# Patient Record
Sex: FEMALE | Race: WHITE | NOT HISPANIC OR LATINO | Employment: PART TIME | URBAN - METROPOLITAN AREA
[De-identification: names, ages, dates, MRNs, and addresses within clinical notes are randomized per-mention and may not be internally consistent; named-entity substitution may affect disease eponyms.]

---

## 2017-02-16 ENCOUNTER — HOSPITAL ENCOUNTER (OUTPATIENT)
Dept: RADIOLOGY | Facility: MEDICAL CENTER | Age: 37
End: 2017-02-16
Attending: PHYSICIAN ASSISTANT
Payer: MEDICAID

## 2017-02-16 DIAGNOSIS — R13.19 CONSTANT LOW-GRADE DYSPHAGIA: ICD-10-CM

## 2017-02-16 DIAGNOSIS — K59.00 UNSPECIFIED CONSTIPATION: ICD-10-CM

## 2017-02-16 DIAGNOSIS — R14.0 ABDOMINAL DISTENTION: ICD-10-CM

## 2017-02-16 PROCEDURE — 700101 HCHG RX REV CODE 250: Performed by: PHYSICIAN ASSISTANT

## 2017-02-16 PROCEDURE — 74220 X-RAY XM ESOPHAGUS 1CNTRST: CPT

## 2017-02-16 RX ADMIN — BARIUM SULFATE 700 MG: 700 TABLET ORAL at 10:20

## 2017-02-24 ENCOUNTER — HOSPITAL ENCOUNTER (OUTPATIENT)
Dept: RADIOLOGY | Facility: MEDICAL CENTER | Age: 37
End: 2017-02-24
Attending: PHYSICIAN ASSISTANT
Payer: MEDICAID

## 2017-02-24 DIAGNOSIS — K59.00 UNSPECIFIED CONSTIPATION: ICD-10-CM

## 2017-02-24 DIAGNOSIS — R14.0 ABDOMINAL DISTENTION: ICD-10-CM

## 2017-02-24 DIAGNOSIS — R13.19 CONSTANT LOW-GRADE DYSPHAGIA: ICD-10-CM

## 2017-02-24 PROCEDURE — 74250 X-RAY XM SM INT 1CNTRST STD: CPT

## 2017-06-29 ENCOUNTER — HOSPITAL ENCOUNTER (EMERGENCY)
Dept: HOSPITAL 8 - ED | Age: 37
Discharge: HOME | End: 2017-06-29
Payer: MEDICAID

## 2017-06-29 VITALS — WEIGHT: 182.76 LBS | HEIGHT: 69 IN | BODY MASS INDEX: 27.07 KG/M2

## 2017-06-29 VITALS — DIASTOLIC BLOOD PRESSURE: 80 MMHG | SYSTOLIC BLOOD PRESSURE: 110 MMHG

## 2017-06-29 DIAGNOSIS — R10.32: ICD-10-CM

## 2017-06-29 DIAGNOSIS — R10.31: Primary | ICD-10-CM

## 2017-06-29 LAB
AST SERPL-CCNC: 11 U/L (ref 15–37)
BUN SERPL-MCNC: 8 MG/DL (ref 7–18)

## 2017-06-29 PROCEDURE — 74177 CT ABD & PELVIS W/CONTRAST: CPT

## 2017-06-29 PROCEDURE — 96375 TX/PRO/DX INJ NEW DRUG ADDON: CPT

## 2017-06-29 PROCEDURE — 99285 EMERGENCY DEPT VISIT HI MDM: CPT

## 2017-06-29 PROCEDURE — 80053 COMPREHEN METABOLIC PANEL: CPT

## 2017-06-29 PROCEDURE — 81003 URINALYSIS AUTO W/O SCOPE: CPT

## 2017-06-29 PROCEDURE — 96361 HYDRATE IV INFUSION ADD-ON: CPT

## 2017-06-29 PROCEDURE — 83690 ASSAY OF LIPASE: CPT

## 2017-06-29 PROCEDURE — 96372 THER/PROPH/DIAG INJ SC/IM: CPT

## 2017-06-29 PROCEDURE — 96374 THER/PROPH/DIAG INJ IV PUSH: CPT

## 2017-06-29 PROCEDURE — 36415 COLL VENOUS BLD VENIPUNCTURE: CPT

## 2017-06-29 PROCEDURE — 96376 TX/PRO/DX INJ SAME DRUG ADON: CPT

## 2017-06-29 PROCEDURE — 85025 COMPLETE CBC W/AUTO DIFF WBC: CPT

## 2017-06-29 RX ADMIN — MORPHINE SULFATE PRN MG: 4 INJECTION INTRAVENOUS at 16:52

## 2017-06-29 RX ADMIN — MORPHINE SULFATE PRN MG: 4 INJECTION INTRAVENOUS at 17:33

## 2017-09-11 ENCOUNTER — HOSPITAL ENCOUNTER (EMERGENCY)
Dept: HOSPITAL 8 - ED | Age: 37
Discharge: HOME | End: 2017-09-11
Payer: MEDICAID

## 2017-09-11 VITALS — SYSTOLIC BLOOD PRESSURE: 122 MMHG | DIASTOLIC BLOOD PRESSURE: 68 MMHG

## 2017-09-11 VITALS — BODY MASS INDEX: 26.78 KG/M2 | WEIGHT: 180.78 LBS | HEIGHT: 69 IN

## 2017-09-11 DIAGNOSIS — R07.2: Primary | ICD-10-CM

## 2017-09-11 DIAGNOSIS — F41.1: ICD-10-CM

## 2017-09-11 DIAGNOSIS — F17.200: ICD-10-CM

## 2017-09-11 LAB
AST SERPL-CCNC: 10 U/L (ref 15–37)
BUN SERPL-MCNC: 7 MG/DL (ref 7–18)
HCT VFR BLD CALC: 44.1 % (ref 34.6–47.8)
HGB BLD-MCNC: 14.9 G/DL (ref 11.7–16.4)
IS PT STATUS REG ER OR PRE ER?: YES
WBC # BLD AUTO: 10.8 X10^3/UL (ref 3.4–10)

## 2017-09-11 PROCEDURE — 36415 COLL VENOUS BLD VENIPUNCTURE: CPT

## 2017-09-11 PROCEDURE — 99285 EMERGENCY DEPT VISIT HI MDM: CPT

## 2017-09-11 PROCEDURE — 71010: CPT

## 2017-09-11 PROCEDURE — 93005 ELECTROCARDIOGRAM TRACING: CPT

## 2017-09-11 PROCEDURE — 84484 ASSAY OF TROPONIN QUANT: CPT

## 2017-09-11 PROCEDURE — 80053 COMPREHEN METABOLIC PANEL: CPT

## 2017-09-11 PROCEDURE — 85025 COMPLETE CBC W/AUTO DIFF WBC: CPT

## 2017-10-25 ENCOUNTER — HOSPITAL ENCOUNTER (EMERGENCY)
Dept: HOSPITAL 8 - ED | Age: 37
Discharge: HOME | End: 2017-10-25
Payer: MEDICAID

## 2017-10-25 VITALS — DIASTOLIC BLOOD PRESSURE: 67 MMHG | SYSTOLIC BLOOD PRESSURE: 100 MMHG

## 2017-10-25 VITALS — WEIGHT: 179.9 LBS | BODY MASS INDEX: 26.64 KG/M2 | HEIGHT: 69 IN

## 2017-10-25 DIAGNOSIS — R19.7: ICD-10-CM

## 2017-10-25 DIAGNOSIS — R10.84: Primary | ICD-10-CM

## 2017-10-25 DIAGNOSIS — Z90.49: ICD-10-CM

## 2017-10-25 LAB
AST SERPL-CCNC: 20 U/L (ref 15–37)
BUN SERPL-MCNC: 8 MG/DL (ref 7–18)
HCT VFR BLD CALC: 50.7 % (ref 34.6–47.8)
HGB BLD-MCNC: 17 G/DL (ref 11.7–16.4)
WBC # BLD AUTO: 12.6 X10^3/UL (ref 3.4–10)

## 2017-10-25 PROCEDURE — 96361 HYDRATE IV INFUSION ADD-ON: CPT

## 2017-10-25 PROCEDURE — 99285 EMERGENCY DEPT VISIT HI MDM: CPT

## 2017-10-25 PROCEDURE — 84703 CHORIONIC GONADOTROPIN ASSAY: CPT

## 2017-10-25 PROCEDURE — 74022 RADEX COMPL AQT ABD SERIES: CPT

## 2017-10-25 PROCEDURE — 96375 TX/PRO/DX INJ NEW DRUG ADDON: CPT

## 2017-10-25 PROCEDURE — 96374 THER/PROPH/DIAG INJ IV PUSH: CPT

## 2017-10-25 PROCEDURE — 81001 URINALYSIS AUTO W/SCOPE: CPT

## 2017-10-25 PROCEDURE — 36415 COLL VENOUS BLD VENIPUNCTURE: CPT

## 2017-10-25 PROCEDURE — 80053 COMPREHEN METABOLIC PANEL: CPT

## 2017-10-25 PROCEDURE — 83690 ASSAY OF LIPASE: CPT

## 2017-10-25 PROCEDURE — 85025 COMPLETE CBC W/AUTO DIFF WBC: CPT

## 2018-10-12 ENCOUNTER — HOSPITAL ENCOUNTER (EMERGENCY)
Dept: HOSPITAL 8 - ED | Age: 38
Discharge: HOME | End: 2018-10-12
Payer: MEDICAID

## 2018-10-12 VITALS — DIASTOLIC BLOOD PRESSURE: 63 MMHG | SYSTOLIC BLOOD PRESSURE: 116 MMHG

## 2018-10-12 VITALS — BODY MASS INDEX: 28.54 KG/M2 | WEIGHT: 192.68 LBS | HEIGHT: 69 IN

## 2018-10-12 DIAGNOSIS — R10.13: ICD-10-CM

## 2018-10-12 DIAGNOSIS — R07.2: Primary | ICD-10-CM

## 2018-10-12 LAB
ALBUMIN SERPL-MCNC: 3.8 G/DL (ref 3.4–5)
ALP SERPL-CCNC: 63 U/L (ref 45–117)
ALT SERPL-CCNC: 19 U/L (ref 12–78)
ANION GAP SERPL CALC-SCNC: 7 MMOL/L (ref 5–15)
BASOPHILS # BLD AUTO: 0.03 X10^3/UL (ref 0–0.1)
BASOPHILS NFR BLD AUTO: 0 % (ref 0–1)
BILIRUB SERPL-MCNC: 0.6 MG/DL (ref 0.2–1)
CALCIUM SERPL-MCNC: 8.8 MG/DL (ref 8.5–10.1)
CHLORIDE SERPL-SCNC: 110 MMOL/L (ref 98–107)
CREAT SERPL-MCNC: 0.52 MG/DL (ref 0.55–1.02)
EOSINOPHIL # BLD AUTO: 0.24 X10^3/UL (ref 0–0.4)
EOSINOPHIL NFR BLD AUTO: 3 % (ref 1–7)
ERYTHROCYTE [DISTWIDTH] IN BLOOD BY AUTOMATED COUNT: 13 % (ref 9.6–15.2)
LYMPHOCYTES # BLD AUTO: 2.71 X10^3/UL (ref 1–3.4)
LYMPHOCYTES NFR BLD AUTO: 31 % (ref 22–44)
MCH RBC QN AUTO: 28.5 PG (ref 27–34.8)
MCHC RBC AUTO-ENTMCNC: 33.6 G/DL (ref 32.4–35.8)
MCV RBC AUTO: 85 FL (ref 80–100)
MD: NO
MONOCYTES # BLD AUTO: 0.68 X10^3/UL (ref 0.2–0.8)
MONOCYTES NFR BLD AUTO: 8 % (ref 2–9)
NEUTROPHILS # BLD AUTO: 5.02 X10^3/UL (ref 1.8–6.8)
NEUTROPHILS NFR BLD AUTO: 58 % (ref 42–75)
PLATELET # BLD AUTO: 335 X10^3/UL (ref 130–400)
PMV BLD AUTO: 7.6 FL (ref 7.4–10.4)
PROT SERPL-MCNC: 7.8 G/DL (ref 6.4–8.2)
RBC # BLD AUTO: 4.87 X10^6/UL (ref 3.82–5.3)
TROPONIN I SERPL-MCNC: < 0.015 NG/ML (ref 0–0.04)

## 2018-10-12 PROCEDURE — 83690 ASSAY OF LIPASE: CPT

## 2018-10-12 PROCEDURE — 99285 EMERGENCY DEPT VISIT HI MDM: CPT

## 2018-10-12 PROCEDURE — 71046 X-RAY EXAM CHEST 2 VIEWS: CPT

## 2018-10-12 PROCEDURE — 80053 COMPREHEN METABOLIC PANEL: CPT

## 2018-10-12 PROCEDURE — 96375 TX/PRO/DX INJ NEW DRUG ADDON: CPT

## 2018-10-12 PROCEDURE — 74177 CT ABD & PELVIS W/CONTRAST: CPT

## 2018-10-12 PROCEDURE — 85025 COMPLETE CBC W/AUTO DIFF WBC: CPT

## 2018-10-12 PROCEDURE — S0028 INJECTION, FAMOTIDINE, 20 MG: HCPCS

## 2018-10-12 PROCEDURE — 36415 COLL VENOUS BLD VENIPUNCTURE: CPT

## 2018-10-12 PROCEDURE — 84484 ASSAY OF TROPONIN QUANT: CPT

## 2018-10-12 PROCEDURE — 85379 FIBRIN DEGRADATION QUANT: CPT

## 2018-10-12 PROCEDURE — 93005 ELECTROCARDIOGRAM TRACING: CPT

## 2018-10-12 PROCEDURE — 96374 THER/PROPH/DIAG INJ IV PUSH: CPT

## 2018-10-12 RX ADMIN — HYDROMORPHONE HYDROCHLORIDE PRN MG: 2 INJECTION INTRAMUSCULAR; INTRAVENOUS; SUBCUTANEOUS at 16:11

## 2018-10-12 RX ADMIN — HYDROMORPHONE HYDROCHLORIDE PRN MG: 2 INJECTION INTRAMUSCULAR; INTRAVENOUS; SUBCUTANEOUS at 16:30

## 2019-03-10 ENCOUNTER — HOSPITAL ENCOUNTER (EMERGENCY)
Dept: HOSPITAL 8 - ED | Age: 39
Discharge: HOME | End: 2019-03-10
Payer: MEDICAID

## 2019-03-10 VITALS — DIASTOLIC BLOOD PRESSURE: 80 MMHG | SYSTOLIC BLOOD PRESSURE: 115 MMHG

## 2019-03-10 VITALS — HEIGHT: 69 IN | BODY MASS INDEX: 29.39 KG/M2 | WEIGHT: 198.42 LBS
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
## 2019-03-10 DIAGNOSIS — S40.021A: Primary | ICD-10-CM
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
## 2019-03-10 DIAGNOSIS — S50.01XA: ICD-10-CM

## 2019-03-10 DIAGNOSIS — S60.211A: ICD-10-CM
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
## 2019-03-10 DIAGNOSIS — F17.200: ICD-10-CM

## 2019-03-10 PROCEDURE — 99283 EMERGENCY DEPT VISIT LOW MDM: CPT

## 2019-03-10 PROCEDURE — 29105 APPLICATION LONG ARM SPLINT: CPT

## 2019-06-20 ENCOUNTER — HOSPITAL ENCOUNTER (EMERGENCY)
Dept: HOSPITAL 8 - ED | Age: 39
Discharge: HOME | End: 2019-06-20
Payer: MEDICAID

## 2019-06-20 VITALS — BODY MASS INDEX: 28.96 KG/M2 | WEIGHT: 195.55 LBS | HEIGHT: 69 IN

## 2019-06-20 VITALS — DIASTOLIC BLOOD PRESSURE: 53 MMHG | SYSTOLIC BLOOD PRESSURE: 107 MMHG

## 2019-06-20 DIAGNOSIS — A09: Primary | ICD-10-CM

## 2019-06-20 DIAGNOSIS — K21.9: ICD-10-CM

## 2019-06-20 DIAGNOSIS — R11.0: ICD-10-CM

## 2019-06-20 DIAGNOSIS — Z90.710: ICD-10-CM

## 2019-06-20 DIAGNOSIS — F41.1: ICD-10-CM

## 2019-06-20 DIAGNOSIS — Z90.49: ICD-10-CM

## 2019-06-20 LAB
ALBUMIN SERPL-MCNC: 3.8 G/DL (ref 3.4–5)
ALP SERPL-CCNC: 59 U/L (ref 45–117)
ALT SERPL-CCNC: 20 U/L (ref 12–78)
ANION GAP SERPL CALC-SCNC: 7 MMOL/L (ref 5–15)
BASOPHILS # BLD AUTO: 0.05 X10^3/UL (ref 0–0.1)
BASOPHILS NFR BLD AUTO: 1 % (ref 0–1)
BILIRUB SERPL-MCNC: 0.4 MG/DL (ref 0.2–1)
CALCIUM SERPL-MCNC: 9.1 MG/DL (ref 8.5–10.1)
CHLORIDE SERPL-SCNC: 108 MMOL/L (ref 98–107)
CREAT SERPL-MCNC: 0.77 MG/DL (ref 0.55–1.02)
EOSINOPHIL # BLD AUTO: 0.22 X10^3/UL (ref 0–0.4)
EOSINOPHIL NFR BLD AUTO: 2 % (ref 1–7)
ERYTHROCYTE [DISTWIDTH] IN BLOOD BY AUTOMATED COUNT: 14.4 % (ref 9.6–15.2)
LYMPHOCYTES # BLD AUTO: 3.14 X10^3/UL (ref 1–3.4)
LYMPHOCYTES NFR BLD AUTO: 30 % (ref 22–44)
MCH RBC QN AUTO: 27.9 PG (ref 27–34.8)
MCHC RBC AUTO-ENTMCNC: 33 G/DL (ref 32.4–35.8)
MCV RBC AUTO: 84.4 FL (ref 80–100)
MD: NO
MONOCYTES # BLD AUTO: 0.43 X10^3/UL (ref 0.2–0.8)
MONOCYTES NFR BLD AUTO: 4 % (ref 2–9)
NEUTROPHILS # BLD AUTO: 6.72 X10^3/UL (ref 1.8–6.8)
NEUTROPHILS NFR BLD AUTO: 64 % (ref 42–75)
PLATELET # BLD AUTO: 330 X10^3/UL (ref 130–400)
PMV BLD AUTO: 7.7 FL (ref 7.4–10.4)
PROT SERPL-MCNC: 7.7 G/DL (ref 6.4–8.2)
RBC # BLD AUTO: 5.09 X10^6/UL (ref 3.82–5.3)

## 2019-06-20 PROCEDURE — 85025 COMPLETE CBC W/AUTO DIFF WBC: CPT

## 2019-06-20 PROCEDURE — 80053 COMPREHEN METABOLIC PANEL: CPT

## 2019-06-20 PROCEDURE — 99283 EMERGENCY DEPT VISIT LOW MDM: CPT

## 2019-06-20 PROCEDURE — 83690 ASSAY OF LIPASE: CPT

## 2019-06-20 PROCEDURE — 84703 CHORIONIC GONADOTROPIN ASSAY: CPT

## 2019-06-20 PROCEDURE — 36415 COLL VENOUS BLD VENIPUNCTURE: CPT

## 2019-06-20 NOTE — NUR
Labs drawn, pt medicated per MAR. Pt reports abdominal pain, bloating, nausea, 
and diarrhea. Pt states diarrhea has been getting more formed over the past 
couple of days and the bloating is new today.

## 2019-10-16 ENCOUNTER — TELEPHONE (OUTPATIENT)
Dept: SCHEDULING | Facility: IMAGING CENTER | Age: 39
End: 2019-10-16

## 2021-02-04 ENCOUNTER — HOSPITAL ENCOUNTER (EMERGENCY)
Dept: HOSPITAL 8 - ED | Age: 41
Discharge: HOME | End: 2021-02-04
Payer: MEDICAID

## 2021-02-04 VITALS — SYSTOLIC BLOOD PRESSURE: 110 MMHG | DIASTOLIC BLOOD PRESSURE: 60 MMHG

## 2021-02-04 VITALS — WEIGHT: 195.77 LBS | BODY MASS INDEX: 29 KG/M2 | HEIGHT: 69 IN

## 2021-02-04 DIAGNOSIS — J02.9: ICD-10-CM

## 2021-02-04 DIAGNOSIS — H92.02: Primary | ICD-10-CM

## 2021-02-04 DIAGNOSIS — B34.9: ICD-10-CM

## 2021-02-04 DIAGNOSIS — K21.9: ICD-10-CM

## 2021-02-04 PROCEDURE — 99283 EMERGENCY DEPT VISIT LOW MDM: CPT

## 2021-02-04 NOTE — NUR
SEEN AT  ON MONDAY FOR LT EAR PAIN/BODYACHES/HA, NO RELIEF W/ ABX, STILL HAS 
2 DAYS LEFT. 



NEGATIVE COVID TEST MONDAY.

 

IS ON AZRITHOMYCIN LAST 2 DAYS

## 2022-04-13 ENCOUNTER — APPOINTMENT (OUTPATIENT)
Dept: RADIOLOGY | Facility: MEDICAL CENTER | Age: 42
End: 2022-04-13
Attending: EMERGENCY MEDICINE
Payer: MEDICAID

## 2022-04-13 ENCOUNTER — HOSPITAL ENCOUNTER (EMERGENCY)
Facility: MEDICAL CENTER | Age: 42
End: 2022-04-14
Attending: EMERGENCY MEDICINE
Payer: MEDICAID

## 2022-04-13 DIAGNOSIS — M54.10 RADICULOPATHY, UNSPECIFIED SPINAL REGION: ICD-10-CM

## 2022-04-13 DIAGNOSIS — M51.34 BULGING OF THORACIC INTERVERTEBRAL DISC: ICD-10-CM

## 2022-04-13 LAB
ALBUMIN SERPL BCP-MCNC: 4.7 G/DL (ref 3.2–4.9)
ALBUMIN/GLOB SERPL: 1.4 G/DL
ALP SERPL-CCNC: 64 U/L (ref 30–99)
ALT SERPL-CCNC: 17 U/L (ref 2–50)
ANION GAP SERPL CALC-SCNC: 11 MMOL/L (ref 7–16)
APPEARANCE UR: CLEAR
AST SERPL-CCNC: 17 U/L (ref 12–45)
BASOPHILS # BLD AUTO: 0.5 % (ref 0–1.8)
BASOPHILS # BLD: 0.04 K/UL (ref 0–0.12)
BILIRUB SERPL-MCNC: 0.3 MG/DL (ref 0.1–1.5)
BILIRUB UR QL STRIP.AUTO: ABNORMAL
BUN SERPL-MCNC: 7 MG/DL (ref 8–22)
CALCIUM SERPL-MCNC: 9.5 MG/DL (ref 8.5–10.5)
CHLORIDE SERPL-SCNC: 103 MMOL/L (ref 96–112)
CO2 SERPL-SCNC: 24 MMOL/L (ref 20–33)
COLOR UR: ABNORMAL
CREAT SERPL-MCNC: 0.58 MG/DL (ref 0.5–1.4)
EOSINOPHIL # BLD AUTO: 0.24 K/UL (ref 0–0.51)
EOSINOPHIL NFR BLD: 3.2 % (ref 0–6.9)
ERYTHROCYTE [DISTWIDTH] IN BLOOD BY AUTOMATED COUNT: 46.2 FL (ref 35.9–50)
GFR SERPLBLD CREATININE-BSD FMLA CKD-EPI: 116 ML/MIN/1.73 M 2
GLOBULIN SER CALC-MCNC: 3.3 G/DL (ref 1.9–3.5)
GLUCOSE SERPL-MCNC: 87 MG/DL (ref 65–99)
GLUCOSE UR STRIP.AUTO-MCNC: NEGATIVE MG/DL
HCG SERPL QL: NEGATIVE
HCT VFR BLD AUTO: 46.4 % (ref 37–47)
HGB BLD-MCNC: 14.6 G/DL (ref 12–16)
IMM GRANULOCYTES # BLD AUTO: 0.03 K/UL (ref 0–0.11)
IMM GRANULOCYTES NFR BLD AUTO: 0.4 % (ref 0–0.9)
KETONES UR STRIP.AUTO-MCNC: ABNORMAL MG/DL
LEUKOCYTE ESTERASE UR QL STRIP.AUTO: NEGATIVE
LYMPHOCYTES # BLD AUTO: 3.31 K/UL (ref 1–4.8)
LYMPHOCYTES NFR BLD: 43.7 % (ref 22–41)
MCH RBC QN AUTO: 27.8 PG (ref 27–33)
MCHC RBC AUTO-ENTMCNC: 31.5 G/DL (ref 33.6–35)
MCV RBC AUTO: 88.4 FL (ref 81.4–97.8)
MICRO URNS: ABNORMAL
MONOCYTES # BLD AUTO: 0.73 K/UL (ref 0–0.85)
MONOCYTES NFR BLD AUTO: 9.6 % (ref 0–13.4)
NEUTROPHILS # BLD AUTO: 3.23 K/UL (ref 2–7.15)
NEUTROPHILS NFR BLD: 42.6 % (ref 44–72)
NITRITE UR QL STRIP.AUTO: NEGATIVE
NRBC # BLD AUTO: 0 K/UL
NRBC BLD-RTO: 0 /100 WBC
PH UR STRIP.AUTO: 5.5 [PH] (ref 5–8)
PLATELET # BLD AUTO: 295 K/UL (ref 164–446)
PMV BLD AUTO: 9.3 FL (ref 9–12.9)
POTASSIUM SERPL-SCNC: 3.7 MMOL/L (ref 3.6–5.5)
PROT SERPL-MCNC: 8 G/DL (ref 6–8.2)
PROT UR QL STRIP: NEGATIVE MG/DL
RBC # BLD AUTO: 5.25 M/UL (ref 4.2–5.4)
RBC UR QL AUTO: NEGATIVE
SODIUM SERPL-SCNC: 138 MMOL/L (ref 135–145)
SP GR UR STRIP.AUTO: 1.03
UROBILINOGEN UR STRIP.AUTO-MCNC: 0.2 MG/DL
WBC # BLD AUTO: 7.6 K/UL (ref 4.8–10.8)

## 2022-04-13 PROCEDURE — A9270 NON-COVERED ITEM OR SERVICE: HCPCS | Performed by: EMERGENCY MEDICINE

## 2022-04-13 PROCEDURE — 85025 COMPLETE CBC W/AUTO DIFF WBC: CPT

## 2022-04-13 PROCEDURE — 96375 TX/PRO/DX INJ NEW DRUG ADDON: CPT

## 2022-04-13 PROCEDURE — 72146 MRI CHEST SPINE W/O DYE: CPT

## 2022-04-13 PROCEDURE — 99285 EMERGENCY DEPT VISIT HI MDM: CPT

## 2022-04-13 PROCEDURE — 700102 HCHG RX REV CODE 250 W/ 637 OVERRIDE(OP): Performed by: EMERGENCY MEDICINE

## 2022-04-13 PROCEDURE — 36415 COLL VENOUS BLD VENIPUNCTURE: CPT

## 2022-04-13 PROCEDURE — 84703 CHORIONIC GONADOTROPIN ASSAY: CPT

## 2022-04-13 PROCEDURE — 700111 HCHG RX REV CODE 636 W/ 250 OVERRIDE (IP): Performed by: EMERGENCY MEDICINE

## 2022-04-13 PROCEDURE — 96374 THER/PROPH/DIAG INJ IV PUSH: CPT

## 2022-04-13 PROCEDURE — 72148 MRI LUMBAR SPINE W/O DYE: CPT

## 2022-04-13 PROCEDURE — 80053 COMPREHEN METABOLIC PANEL: CPT

## 2022-04-13 PROCEDURE — 81003 URINALYSIS AUTO W/O SCOPE: CPT

## 2022-04-13 RX ORDER — CYCLOBENZAPRINE HCL 10 MG
10 TABLET ORAL ONCE
Status: COMPLETED | OUTPATIENT
Start: 2022-04-13 | End: 2022-04-13

## 2022-04-13 RX ORDER — PREDNISONE 20 MG/1
40 TABLET ORAL ONCE
Status: COMPLETED | OUTPATIENT
Start: 2022-04-13 | End: 2022-04-13

## 2022-04-13 RX ORDER — KETOROLAC TROMETHAMINE 30 MG/ML
30 INJECTION, SOLUTION INTRAMUSCULAR; INTRAVENOUS ONCE
Status: COMPLETED | OUTPATIENT
Start: 2022-04-13 | End: 2022-04-13

## 2022-04-13 RX ORDER — LORAZEPAM 2 MG/ML
1 INJECTION INTRAMUSCULAR ONCE
Status: COMPLETED | OUTPATIENT
Start: 2022-04-13 | End: 2022-04-13

## 2022-04-13 RX ORDER — PREDNISONE 20 MG/1
40 TABLET ORAL DAILY
Qty: 10 TABLET | Refills: 0 | Status: SHIPPED | OUTPATIENT
Start: 2022-04-13 | End: 2022-04-18

## 2022-04-13 RX ADMIN — LORAZEPAM 1 MG: 2 INJECTION INTRAMUSCULAR; INTRAVENOUS at 20:26

## 2022-04-13 RX ADMIN — PREDNISONE 40 MG: 20 TABLET ORAL at 19:40

## 2022-04-13 RX ADMIN — CYCLOBENZAPRINE 10 MG: 10 TABLET, FILM COATED ORAL at 19:40

## 2022-04-13 RX ADMIN — KETOROLAC TROMETHAMINE 30 MG: 30 INJECTION, SOLUTION INTRAMUSCULAR at 19:42

## 2022-04-13 ASSESSMENT — ENCOUNTER SYMPTOMS
NAUSEA: 0
FEVER: 0
BACK PAIN: 1
ABDOMINAL PAIN: 0
SHORTNESS OF BREATH: 0
CHILLS: 0
VOMITING: 0

## 2022-04-13 ASSESSMENT — PAIN DESCRIPTION - PAIN TYPE: TYPE: ACUTE PAIN

## 2022-04-14 VITALS
WEIGHT: 198.41 LBS | DIASTOLIC BLOOD PRESSURE: 58 MMHG | HEIGHT: 69 IN | HEART RATE: 63 BPM | BODY MASS INDEX: 29.39 KG/M2 | OXYGEN SATURATION: 93 % | SYSTOLIC BLOOD PRESSURE: 105 MMHG | TEMPERATURE: 97 F | RESPIRATION RATE: 18 BRPM

## 2022-04-14 NOTE — ED PROVIDER NOTES
ED Provider Note    Scribed for Yamilka Way M.D. by Sri Rubio. 4/13/2022, 6:41 PM.    Primary care provider: DEBO Higginbotham  Means of arrival: Walk-in  History obtained from: Patient  History limited by: None    CHIEF COMPLAINT  Chief Complaint   Patient presents with   • Leg Pain     R leg pain radiating down R leg. Patient reports pain/numbness and tingling.   • Back Pain     R lower back pain.        HPI  Edith Glass is a 41 y.o. female who presents to the Emergency Department for back pain onset 1-2 weeks ago. Patient describes that her injury occurred while lifting things at work and is located to the middle of her mid back. She has associated burning pain from her back down to her right lateral thigh and pelvis. Patient describes further that when she walks she has incontinence and this started with the back pain. Patient was seen by a chiropractor today who advised her to come to the ED. Denies dysuria.  She has still been able to ambulate and perform normal daily activities.  Denies any fevers or chills.    REVIEW OF SYSTEMS  Review of Systems   Constitutional: Negative for chills and fever.   Respiratory: Negative for shortness of breath.    Cardiovascular: Negative for chest pain.   Gastrointestinal: Negative for abdominal pain, nausea and vomiting.   Genitourinary: Negative for dysuria.        Positive for urinary incontinence and pelvic pain   Musculoskeletal: Positive for back pain.        Positive her right leg pain     All other systems reviewed and are negative.       PAST MEDICAL HISTORY   has a past medical history of Psychiatric disorder (depression).    SURGICAL HISTORY   has a past surgical history that includes other orthopedic surgery and hysterectomy laparoscopy.    SOCIAL HISTORY  Social History     Tobacco Use   • Smoking status: Current Every Day Smoker     Packs/day: 0.50     Years: 24.00     Pack years: 12.00     Types: Cigarettes   • Smokeless tobacco: Never  "Used   Substance Use Topics   • Alcohol use: No   • Drug use: No      Social History     Substance and Sexual Activity   Drug Use No     CURRENT MEDICATIONS  Home Medications     Reviewed by Mely Katz R.N. (Registered Nurse) on 04/13/22 at 1744  Med List Status: <None>   Medication Last Dose Status   albuterol 108 (90 BASE) MCG/ACT Aero Soln inhalation aerosol  Active   azithromycin (ZITHROMAX) 250 MG Tab  Active                 ALLERGIES  Allergies   Allergen Reactions   • Nkda [No Known Drug Allergy]        PHYSICAL EXAM  VITAL SIGNS: /67   Pulse 73   Temp 36.2 °C (97.1 °F) (Temporal)   Resp 16   Ht 1.753 m (5' 9\")   Wt 90 kg (198 lb 6.6 oz)   LMP 11/05/2009   SpO2 99%   BMI 29.30 kg/m²   Vitals reviewed by myself.  Physical Exam  Nursing note and vitals reviewed.  Constitutional: Well-developed and well-nourished. No acute distress.   HENT: Head is normocephalic and atraumatic.  Eyes: extra-ocular movements intact  Cardiovascular: regular rate and regular rhythm. No murmur heard.  Pulmonary/Chest: Breath sounds normal. No wheezes or rales.   Musculoskeletal: Patient has 5 out of 5 strength in all extremities.  Midline lower thoracic and upper lumbarspinal tenderness is present.  Sensation intact to bilateral lower extremities.  Patient complains of paresthesias along the right lateral thigh.  Neurological: Awake and alert, sensation intact to bilateral lower extremities  Skin: Skin is warm and dry. No rash.       DIAGNOSTIC STUDIES  LABS  Labs Reviewed   CBC WITH DIFFERENTIAL - Abnormal; Notable for the following components:       Result Value    MCHC 31.5 (*)     Neutrophils-Polys 42.60 (*)     Lymphocytes 43.70 (*)     All other components within normal limits   COMP METABOLIC PANEL - Abnormal; Notable for the following components:    Bun 7 (*)     All other components within normal limits   URINALYSIS,CULTURE IF INDICATED - Abnormal; Notable for the following components:    Ketones " Trace (*)     Bilirubin Small (*)     All other components within normal limits    Narrative:     Indication for culture:->Patient WITHOUT an indwelling Caicedo  catheter in place with new onset of Dysuria, Frequency,  Urgency, and/or Suprapubic pain   HCG QUAL SERUM   ESTIMATED GFR     All labs reviewed by me.    RADIOLOGY  MR-THORACIC SPINE-W/O    (Results Pending)   MR-LUMBAR SPINE-W/O    (Results Pending)     Preliminary reading demonstrates disc bulge at T6-T7, this is touching the spinal cord per radiologist without spinal cord edema and no cord signal    The radiologist's interpretation of all radiological studies have been reviewed by me.    REASSESSMENT    6:41 PM - Patient seen and examined at bedside. Discussed plan of care, including labs and an MRI. I informed the patient that the MRI may take several hours to obtain from the ER. Patient agrees to the plan of care. She will be treated with Toradol, flexeril, and Deltasone.     8:00 PM - Patient will be treated with ativan for claustrophobia for MRI.     10:01 PM - Updated the patient on the MRI results. We are waiting for a consult form the spine specialist to determine the best plan of care.    10:21 PM I discussed the patient's case and the above findings with Dr. Copeland (spine) who reviewed images and recommended outpatient follow up.     10:44 PM - Informed the patient of the need for outpatient follow up. Prescribed Prednisone for radiculopathy. Discussed return precautions. Patient will be discharged at this time. She verbalizes agreement with discharge and plan of care. Patient does not have a ride home. She will be allowed to rest in the ED for a few hours until the ativan wears off and she is more awake to safely drive.    COURSE & MEDICAL DECISION MAKING  Nursing notes, VS, PMSFHx reviewed in chart.    Patient is a 41-year-old female who comes in for evaluation of back pain and urinary incontinence.  Differential diagnosis includes radiculopathy,  herniated disc, spinal cord impingement.  Patient symptoms of incontinence and paresthesias are concerning for spinal cord impingement, therefore MRI of the thoracic and lumbar spine will be ordered.  Further diagnostic work-up includes labs and urinalysis.    Patient's initial vitals are within normal limits.  She is treated with prednisone, Toradol, Flexeril for management of discomfort after which she feels improved.  She is also given Ativan to tolerate MRI.  Labs returned and are unremarkable.  Urinalysis demonstrates no signs of infection.  MRI consistent with disc bulge at T6-T7 with touching of the spinal cord, no spinal cord edema or enhanced cord signal.  Given her symptoms I did discuss the case with Dr. Copeland, spine surgeon, who reviewed films and advises no evidence for emergent management.  Patient can follow-up outpatient, likely will need conservative therapy.  This is discussed with patient and she is amenable to this plan.  I will prescribe her a course of prednisone for her radiculopathy.  She is then given strict return precautions and discharged in stable condition.     he patient will return for new or worsening symptoms and is stable at the time of discharge.    DISPOSITION:  Patient will be discharged home in stable condition.    FOLLOW UP:  DEBO Higginbotham  [address]  [address]  [facility] [address]  [phone]          Sanjay Copeland M.D.  [address] [facility] [id number] [phone]    Schedule an appointment as soon as possible for a visit         OUTPATIENT MEDICATIONS:  New Prescriptions    PREDNISONE (DELTASONE) 20 MG TAB    Take 2 Tablets by mouth every day for 5 days.       FINAL IMPRESSION  1. Radiculopathy, unspecified spinal region    2. Bulging of thoracic intervertebral disc          Sri ORTIZ (Lizzeth), am scribing for, and in the presence of, Yamilka Way M.D..    Electronically signed by: Sri Rubio (Lizzeth), 4/13/2022    Yamilka ORTIZ M.D.  personally performed the services described in this documentation, as scribed by Sri Rubio in my presence, and it is both accurate and complete.     The note accurately reflects work and decisions made by me.  Yamilka Way M.D.  4/13/2022  11:58 PM

## 2022-04-14 NOTE — ED NOTES
"Pt discharged home, pt A&Ox4, on room air, steady gait. Pt educated on worsening s/s, pt verbalized understanding. IV discontinued and gauze placed, pt in possession of belongings. Pt provided discharge education and information pertaining to medications and follow up appointments. Pt received copy of discharge instructions and verbalized understanding. /58   Pulse 63   Temp 36.1 °C (97 °F) (Temporal)   Resp 18   Ht 1.753 m (5' 9\")   Wt 90 kg (198 lb 6.6 oz)   LMP 11/05/2009   SpO2 93%   BMI 29.30 kg/m²   "

## 2022-04-14 NOTE — ED NOTES
Pt medicated per MAR, education provided on medication, pt verbalized understanding. Pt updated on POC. No needs at the moment.

## 2022-04-14 NOTE — ED TRIAGE NOTES
"Chief Complaint   Patient presents with   • Leg Pain     R leg pain radiating down R leg. Patient reports pain/numbness and tingling.   • Back Pain     R lower back pain.        40 yo female to triage for above complaint. Patient reports worsening R leg and R lower back pain [id number]. Patient reports pelvic pain and also episodes or urinary incontinence as well.  Patient sent from chiropractor for concern of symptoms.    Pt is alert and oriented, speaking in full sentences, follows commands and responds appropriately to questions.     Patient placed back in lobby and educated on triage process. Asked to inform RN of any changes.    /67   Pulse 73   Temp 36.2 °C (97.1 °F) (Temporal)   Resp 16   Ht 1.753 m (5' 9\")   Wt 90 kg (198 lb 6.6 oz)   LMP 11/05/2009   SpO2 99%   BMI 29.30 kg/m²     "

## 2023-05-22 ENCOUNTER — GYNECOLOGY VISIT (OUTPATIENT)
Dept: OBGYN | Facility: CLINIC | Age: 43
End: 2023-05-22
Payer: MEDICAID

## 2023-05-22 DIAGNOSIS — F17.210 CIGARETTE SMOKER: ICD-10-CM

## 2023-05-22 DIAGNOSIS — N94.10 DYSPAREUNIA, FEMALE: ICD-10-CM

## 2023-05-22 DIAGNOSIS — R10.2 PELVIC PAIN: ICD-10-CM

## 2023-05-22 DIAGNOSIS — N81.6 RECTOCELE: ICD-10-CM

## 2023-05-22 DIAGNOSIS — N39.46 MIXED INCONTINENCE: ICD-10-CM

## 2023-05-22 DIAGNOSIS — K59.02 OUTLET DYSFUNCTION CONSTIPATION: ICD-10-CM

## 2023-05-22 LAB
APPEARANCE UR: CLEAR
BILIRUB UR STRIP-MCNC: NORMAL MG/DL
COLOR UR AUTO: YELLOW
GLUCOSE UR STRIP.AUTO-MCNC: NEGATIVE MG/DL
KETONES UR STRIP.AUTO-MCNC: NORMAL MG/DL
LEUKOCYTE ESTERASE UR QL STRIP.AUTO: NORMAL
NITRITE UR QL STRIP.AUTO: NEGATIVE
PH UR STRIP.AUTO: 6.5 [PH] (ref 5–8)
PROT UR QL STRIP: 30 MG/DL
RBC UR QL AUTO: NEGATIVE
SP GR UR STRIP.AUTO: 1.02
UROBILINOGEN UR STRIP-MCNC: NORMAL MG/DL

## 2023-05-22 PROCEDURE — 99204 OFFICE O/P NEW MOD 45 MIN: CPT | Mod: 25 | Performed by: STUDENT IN AN ORGANIZED HEALTH CARE EDUCATION/TRAINING PROGRAM

## 2023-05-22 PROCEDURE — 81002 URINALYSIS NONAUTO W/O SCOPE: CPT | Performed by: STUDENT IN AN ORGANIZED HEALTH CARE EDUCATION/TRAINING PROGRAM

## 2023-05-22 RX ORDER — DIPHENHYDRAMINE HCL 25 MG
25 CAPSULE ORAL EVERY 6 HOURS PRN
COMMUNITY
End: 2023-10-04

## 2023-05-22 RX ORDER — SERTRALINE HYDROCHLORIDE 100 MG/1
100 TABLET, FILM COATED ORAL DAILY
COMMUNITY
Start: 2023-05-16

## 2023-05-22 RX ORDER — GABAPENTIN 400 MG/1
400 CAPSULE ORAL 3 TIMES DAILY
Status: ON HOLD | COMMUNITY
End: 2023-10-11

## 2023-05-22 ASSESSMENT — FIBROSIS 4 INDEX: FIB4 SCORE: 0.59

## 2023-05-22 NOTE — PROGRESS NOTES
Urogynecology and Pelvic Reconstructive Surgery Consultation Visit    Edith Glass MRN:[id number] :1980      Reason for Visit: Consult      Subjective     History of Presenting Illness:    Edith Glass is a 42 y.o. year old P3 who presents for the evaluation and management of urinary leakage.     She has had urinary leakage, mostly with activity, worsening including when walking. She leaks drops, not large amounts. She has had the symptoms for many years but they are getting somewhat worse.  Also has urinary urgency and has to rush to the bathroom, but does not leak.      She has a feeling of difficulty emptying her rectum where she has to use a finger in the vagina to help empty.  She reports that she has done this for years, even before becoming pregnant or having children, but this is somewhat worse.    She reports family history of pelvic floor disorders including her mother.    She is a current cigarette smoker, 1 pack/day.      Prior Pelvic surgery:   2011: Emergency hysterectomy for postpartum hemorrhage     Prior treatment:   None     Fluid intake:   2 cups water  2 cups tea    Pelvic floor symptom review:     Bladder:   Voids per day: 7 Voids per night: 2      Urinary incontinence episodes per day: depends on activity    Urge leakage:  None   Stress leakage: With Cough, With Laugh, and With Exercise   Continuous / insensible urine loss: No    Nocturnal enuresis: No    Leakage volume: Drops   Number of pads/day: 0    Bladder emptying: Incomplete   Voiding symptoms: Hesitancy, Post-Void Dribble, and Double or Triple Voiding   UTI in last 12 months: no   Other urologic history: no      Prolapse:     Prolapse symptoms: Pelvic Pressure, incomplete rectal emptying   Degree of prolapse: To Introitus     Bowel:    Constipation: Yes   Bowel movements per day: every few days    Straining to empty bowels: Yes   Splinting to evacuate: Yes   Painful evacuation: Yes   Difficulty emptying rectum:  Yes   Incontinence to stool: No   Blood in stool: No    Hemorrhoids: Yes      Sexual function:    Sexually active: Yes   Gender of partners: Male   Pain with intercourse: Yes            Past medical and surgical history    Past obstetric history   Number of vaginal deliveries: 3   Number of  deliveries: 0      Past gynecological history:    Last menstrual period: Patient's last menstrual period was 2009.   History of abnormal uterine bleeding: No    History of fibroids: No    History of endometrial polyps:  No    History of endometriosis: No    History of cervical dysplasia: No        Past medical history:  Past Medical History:   Diagnosis Date    Psychiatric disorder depression     Past surgical history:  Past Surgical History:   Procedure Laterality Date    CHOLECYSTECTOMY      HYSTERECTOMY LAPAROSCOPY      OTHER ORTHOPEDIC SURGERY      NECK SX     Medications:has a current medication list which includes the following prescription(s): sertraline, gabapentin, diphenhydramine, albuterol, and azithromycin.  Allergies:Nkda [no known drug allergy]  Family history:  Family History   Problem Relation Age of Onset    Heart Disease Mother      Social history: reports that she has been smoking cigarettes. She has a 12.00 pack-year smoking history. She has never used smokeless tobacco. She reports that she does not drink alcohol and does not use drugs.    Review of systems: A full review of systems was performed, and negative with the exception of want is noted above in the HPI.        Objective        Wt (P) 178 lb   LMP 2009   BMI (P) 26.29 kg/m²     Physical Exam  Vitals reviewed. Exam conducted with a chaperone present (MA - see notes.).   Constitutional:       Appearance: Normal appearance.   HENT:      Head: Normocephalic.      Mouth/Throat:      Mouth: Mucous membranes are moist.   Cardiovascular:      Rate and Rhythm: Normal rate.   Pulmonary:      Effort: Pulmonary effort is normal.    Abdominal:      Palpations: Abdomen is soft. There is no mass.      Tenderness: There is no abdominal tenderness.   Skin:     General: Skin is warm and dry.   Neurological:      Mental Status: She is alert.   Psychiatric:         Mood and Affect: Mood normal.         Genitourinary:    External female genitalia: WNL   Vulva: WNL   Bulbocavernosus reflex: Intact   Anal wink reflex: Intact   Perineal sensation: WNL   Urethra: Hypermobile   Vagina: WNL   Atrophy: Mild   Cough stress test: Negative empty    Pelvic floor:    POP-Q: Aa -1 / Ba -1 / TVL 11 / C -8 / D x / Ap -0.5 / Bp -0.5 / GH 4 / PB 2   Palpable rectocele with stool in rectum (hard)   Prolapse stage: 2   Paravaginal defect: none   Cervical elongation: No    Urethral tenderness: No    Bladder/ suprapubic tenderness: No    Levator tenderness: None   Levator muscle tone: WNL   Pelvic floor contraction strength (modified Oxford scale): 2=Weak   Pelvic floor contraction duration: Brief    Bimanual exam: Uterus surgically absent      Procedure Performed: No    Diagnostic test and records review:    Urine dipstick: neg    Labs: n/a    Radiology: n/a    Documentation reviewed: Prior EMR Records           Assessment & Plan     Edith Glass is a 42 y.o. year old P3 with stress predominant LUIS ANGEL, stage 2 posterior prolapse with outlet dysfunction. We discussed my recommendations for further diagnosis and treatment at length today.       1. Mixed urinary incontinence  2. Current smoker   Ms. Glass reports symptoms of mixed stress and urge urinary incontinence, stress predominant, mild leakage quantity.   She was educated on the pathophysiology of bladder urgency, and that her symptoms are likely due to overactivity of the bladder muscle and nerves. The pathogenesis of GINNY is related to weakness in the pelvic structures includes genetic tendency, aging, menopause and childbirth injuries. I discussed options for management which include both nonsurgical and  surgical options. For GINNY, management includes non-surgical pelvic floor physical therapy and pessary use.  I discussed different types of pessary that may be used for stress urinary incontinence as well as pessary care. I discussed the possibility of midurethral mesh sling and urethral bulking in detail.   - Referred to pelvic floor PT  - Given her smoking status, I will not offer a sling procedure, due to significant elevated risk of complications including mesh exposure long term.   - Thus, my recommendation would be for urethral bulking. This is a safe, short, and minimal recovery procedure. There is a 90% success rate for some improvement, 60% success for major improvement, and 25% of patient require a second treatment for optimal effect.   - I feel comfortable proceeding with bulking without urodynamic testing.   - She will consider her options and call if she desires scheduling. She will decide if she desires bulking, with or without prolapse repair (see below)      2. Rectocele  3. Outlet dysfunction constipation  She has a palpable rectocele with stool present, in conjunction with a need to splint to have a BM. Unusually, this splinting has occurred her whole life, preceding childbirth. While usually a posterior repair/perineorrhaphy (correction of anatomic defect) would usually lead to symptomatic improvement in splinting in 80%, I cannot quote the likelihood of success in her case. She denies bulge symptoms aside from pressure. She will consider her options and follow up.       4. Pelvic pain  5. Dyspareunia  Referred to pelvic floor PT                          Mao Correia MD, FACOG  Urogynecology and Pelvic Reconstructive Surgery  Department of Obstetrics and Gynecology  [facility]        This medical record contains text that has been entered with the assistance of computer voice recognition and dictation software.  Therefore, it may contain unintended  errors in text, spelling, punctuation, or grammar

## 2023-09-29 ENCOUNTER — APPOINTMENT (OUTPATIENT)
Dept: ADMISSIONS | Facility: MEDICAL CENTER | Age: 43
DRG: 472 | End: 2023-09-29
Attending: NEUROLOGICAL SURGERY
Payer: MEDICAID

## 2023-10-04 ENCOUNTER — PRE-ADMISSION TESTING (OUTPATIENT)
Dept: ADMISSIONS | Facility: MEDICAL CENTER | Age: 43
DRG: 472 | End: 2023-10-04
Attending: NEUROLOGICAL SURGERY
Payer: MEDICAID

## 2023-10-04 RX ORDER — RIZATRIPTAN BENZOATE 10 MG/1
TABLET, ORALLY DISINTEGRATING ORAL
COMMUNITY
Start: 2023-08-10 | End: 2024-03-24

## 2023-10-04 RX ORDER — RIMEGEPANT SULFATE 75 MG/75MG
TABLET, ORALLY DISINTEGRATING ORAL
Status: ON HOLD | COMMUNITY
Start: 2023-08-10 | End: 2023-10-11

## 2023-10-04 RX ORDER — IBUPROFEN 800 MG/1
800 TABLET ORAL EVERY 6 HOURS
COMMUNITY
Start: 2023-08-15 | End: 2023-10-16

## 2023-10-04 RX ORDER — FREMANEZUMAB-VFRM 225 MG/1.5ML
INJECTION SUBCUTANEOUS
COMMUNITY
Start: 2023-09-14

## 2023-10-05 ENCOUNTER — PRE-ADMISSION TESTING (OUTPATIENT)
Dept: ADMISSIONS | Facility: MEDICAL CENTER | Age: 43
DRG: 472 | End: 2023-10-05
Attending: NEUROLOGICAL SURGERY
Payer: MEDICAID

## 2023-10-05 ENCOUNTER — HOSPITAL ENCOUNTER (OUTPATIENT)
Dept: RADIOLOGY | Facility: MEDICAL CENTER | Age: 43
DRG: 472 | End: 2023-10-05
Attending: NEUROLOGICAL SURGERY
Payer: MEDICAID

## 2023-10-05 DIAGNOSIS — M48.02 SPINAL STENOSIS IN CERVICAL REGION: ICD-10-CM

## 2023-10-05 DIAGNOSIS — Z01.810 PREOPERATIVE CARDIOVASCULAR EXAMINATION: ICD-10-CM

## 2023-10-05 DIAGNOSIS — Z01.812 PRE-PROCEDURAL LABORATORY EXAMINATIONS: ICD-10-CM

## 2023-10-05 LAB
ANION GAP SERPL CALC-SCNC: 10 MMOL/L (ref 7–16)
APPEARANCE UR: CLEAR
APTT PPP: 31.4 SEC (ref 24.7–36)
BASOPHILS # BLD AUTO: 0.3 % (ref 0–1.8)
BASOPHILS # BLD: 0.04 K/UL (ref 0–0.12)
BILIRUB UR QL STRIP.AUTO: NEGATIVE
BUN SERPL-MCNC: 9 MG/DL (ref 8–22)
CALCIUM SERPL-MCNC: 9.7 MG/DL (ref 8.5–10.5)
CHLORIDE SERPL-SCNC: 106 MMOL/L (ref 96–112)
CO2 SERPL-SCNC: 23 MMOL/L (ref 20–33)
COLOR UR: YELLOW
CREAT SERPL-MCNC: 0.48 MG/DL (ref 0.5–1.4)
EKG IMPRESSION: NORMAL
EOSINOPHIL # BLD AUTO: 0.34 K/UL (ref 0–0.51)
EOSINOPHIL NFR BLD: 2.9 % (ref 0–6.9)
ERYTHROCYTE [DISTWIDTH] IN BLOOD BY AUTOMATED COUNT: 42.9 FL (ref 35.9–50)
GFR SERPLBLD CREATININE-BSD FMLA CKD-EPI: 120 ML/MIN/1.73 M 2
GLUCOSE SERPL-MCNC: 121 MG/DL (ref 65–99)
GLUCOSE UR STRIP.AUTO-MCNC: NEGATIVE MG/DL
HCT VFR BLD AUTO: 46.8 % (ref 37–47)
HGB BLD-MCNC: 15.9 G/DL (ref 12–16)
IMM GRANULOCYTES # BLD AUTO: 0.03 K/UL (ref 0–0.11)
IMM GRANULOCYTES NFR BLD AUTO: 0.3 % (ref 0–0.9)
INR PPP: 1.02 (ref 0.87–1.13)
KETONES UR STRIP.AUTO-MCNC: NEGATIVE MG/DL
LEUKOCYTE ESTERASE UR QL STRIP.AUTO: NEGATIVE
LYMPHOCYTES # BLD AUTO: 4.05 K/UL (ref 1–4.8)
LYMPHOCYTES NFR BLD: 35.1 % (ref 22–41)
MCH RBC QN AUTO: 28.5 PG (ref 27–33)
MCHC RBC AUTO-ENTMCNC: 34 G/DL (ref 32.2–35.5)
MCV RBC AUTO: 83.9 FL (ref 81.4–97.8)
MICRO URNS: NORMAL
MONOCYTES # BLD AUTO: 0.72 K/UL (ref 0–0.85)
MONOCYTES NFR BLD AUTO: 6.2 % (ref 0–13.4)
NEUTROPHILS # BLD AUTO: 6.37 K/UL (ref 1.82–7.42)
NEUTROPHILS NFR BLD: 55.2 % (ref 44–72)
NITRITE UR QL STRIP.AUTO: NEGATIVE
NRBC # BLD AUTO: 0 K/UL
NRBC BLD-RTO: 0 /100 WBC (ref 0–0.2)
PH UR STRIP.AUTO: 6 [PH] (ref 5–8)
PLATELET # BLD AUTO: 278 K/UL (ref 164–446)
PMV BLD AUTO: 9.6 FL (ref 9–12.9)
POTASSIUM SERPL-SCNC: 3.8 MMOL/L (ref 3.6–5.5)
PROT UR QL STRIP: NEGATIVE MG/DL
PROTHROMBIN TIME: 13.5 SEC (ref 12–14.6)
RBC # BLD AUTO: 5.58 M/UL (ref 4.2–5.4)
RBC UR QL AUTO: NEGATIVE
SODIUM SERPL-SCNC: 139 MMOL/L (ref 135–145)
SP GR UR STRIP.AUTO: 1.01
UROBILINOGEN UR STRIP.AUTO-MCNC: 0.2 MG/DL
WBC # BLD AUTO: 11.6 K/UL (ref 4.8–10.8)

## 2023-10-05 PROCEDURE — 71046 X-RAY EXAM CHEST 2 VIEWS: CPT

## 2023-10-05 PROCEDURE — 93005 ELECTROCARDIOGRAM TRACING: CPT

## 2023-10-05 PROCEDURE — 93010 ELECTROCARDIOGRAM REPORT: CPT | Performed by: INTERNAL MEDICINE

## 2023-10-05 PROCEDURE — 81003 URINALYSIS AUTO W/O SCOPE: CPT

## 2023-10-05 PROCEDURE — 85025 COMPLETE CBC W/AUTO DIFF WBC: CPT

## 2023-10-05 PROCEDURE — 80048 BASIC METABOLIC PNL TOTAL CA: CPT

## 2023-10-05 PROCEDURE — 72050 X-RAY EXAM NECK SPINE 4/5VWS: CPT

## 2023-10-05 PROCEDURE — 85610 PROTHROMBIN TIME: CPT

## 2023-10-05 PROCEDURE — 85730 THROMBOPLASTIN TIME PARTIAL: CPT

## 2023-10-05 PROCEDURE — 36415 COLL VENOUS BLD VENIPUNCTURE: CPT

## 2023-10-10 ENCOUNTER — APPOINTMENT (OUTPATIENT)
Dept: RADIOLOGY | Facility: MEDICAL CENTER | Age: 43
DRG: 472 | End: 2023-10-10
Attending: NEUROLOGICAL SURGERY
Payer: MEDICAID

## 2023-10-10 ENCOUNTER — ANESTHESIA (OUTPATIENT)
Dept: SURGERY | Facility: MEDICAL CENTER | Age: 43
DRG: 472 | End: 2023-10-10
Payer: MEDICAID

## 2023-10-10 ENCOUNTER — HOSPITAL ENCOUNTER (INPATIENT)
Facility: MEDICAL CENTER | Age: 43
LOS: 1 days | DRG: 472 | End: 2023-10-11
Attending: NEUROLOGICAL SURGERY | Admitting: NEUROLOGICAL SURGERY
Payer: MEDICAID

## 2023-10-10 ENCOUNTER — ANESTHESIA EVENT (OUTPATIENT)
Dept: SURGERY | Facility: MEDICAL CENTER | Age: 43
DRG: 472 | End: 2023-10-10
Payer: MEDICAID

## 2023-10-10 DIAGNOSIS — G89.18 POSTOPERATIVE PAIN: ICD-10-CM

## 2023-10-10 PROCEDURE — 95939 C MOTOR EVOKED UPR&LWR LIMBS: CPT | Performed by: NEUROLOGICAL SURGERY

## 2023-10-10 PROCEDURE — 110371 HCHG SHELL REV 272: Performed by: NEUROLOGICAL SURGERY

## 2023-10-10 PROCEDURE — 95955 EEG DURING SURGERY: CPT | Performed by: NEUROLOGICAL SURGERY

## 2023-10-10 PROCEDURE — 0RB30ZZ EXCISION OF CERVICAL VERTEBRAL DISC, OPEN APPROACH: ICD-10-PCS | Performed by: NEUROLOGICAL SURGERY

## 2023-10-10 PROCEDURE — 700102 HCHG RX REV CODE 250 W/ 637 OVERRIDE(OP): Performed by: PHYSICIAN ASSISTANT

## 2023-10-10 PROCEDURE — 72040 X-RAY EXAM NECK SPINE 2-3 VW: CPT

## 2023-10-10 PROCEDURE — 160009 HCHG ANES TIME/MIN: Performed by: NEUROLOGICAL SURGERY

## 2023-10-10 PROCEDURE — A9270 NON-COVERED ITEM OR SERVICE: HCPCS | Performed by: ANESTHESIOLOGY

## 2023-10-10 PROCEDURE — 700105 HCHG RX REV CODE 258: Performed by: PHYSICIAN ASSISTANT

## 2023-10-10 PROCEDURE — A9270 NON-COVERED ITEM OR SERVICE: HCPCS | Performed by: PHYSICIAN ASSISTANT

## 2023-10-10 PROCEDURE — 95938 SOMATOSENSORY TESTING: CPT | Performed by: NEUROLOGICAL SURGERY

## 2023-10-10 PROCEDURE — C1713 ANCHOR/SCREW BN/BN,TIS/BN: HCPCS | Performed by: NEUROLOGICAL SURGERY

## 2023-10-10 PROCEDURE — 95937 NEUROMUSCULAR JUNCTION TEST: CPT | Performed by: NEUROLOGICAL SURGERY

## 2023-10-10 PROCEDURE — 700111 HCHG RX REV CODE 636 W/ 250 OVERRIDE (IP): Performed by: ANESTHESIOLOGY

## 2023-10-10 PROCEDURE — 502000 HCHG MISC OR IMPLANTS RC 0278: Performed by: NEUROLOGICAL SURGERY

## 2023-10-10 PROCEDURE — 160029 HCHG SURGERY MINUTES - 1ST 30 MINS LEVEL 4: Performed by: NEUROLOGICAL SURGERY

## 2023-10-10 PROCEDURE — 160036 HCHG PACU - EA ADDL 30 MINS PHASE I: Performed by: NEUROLOGICAL SURGERY

## 2023-10-10 PROCEDURE — 700111 HCHG RX REV CODE 636 W/ 250 OVERRIDE (IP): Performed by: PHYSICIAN ASSISTANT

## 2023-10-10 PROCEDURE — 160002 HCHG RECOVERY MINUTES (STAT): Performed by: NEUROLOGICAL SURGERY

## 2023-10-10 PROCEDURE — 4A11X4G MONITORING OF PERIPHERAL NERVOUS ELECTRICAL ACTIVITY, INTRAOPERATIVE, EXTERNAL APPROACH: ICD-10-PCS | Performed by: NEUROLOGICAL SURGERY

## 2023-10-10 PROCEDURE — 160048 HCHG OR STATISTICAL LEVEL 1-5: Performed by: NEUROLOGICAL SURGERY

## 2023-10-10 PROCEDURE — 95940 IONM IN OPERATNG ROOM 15 MIN: CPT | Performed by: NEUROLOGICAL SURGERY

## 2023-10-10 PROCEDURE — 700101 HCHG RX REV CODE 250: Performed by: NEUROLOGICAL SURGERY

## 2023-10-10 PROCEDURE — 00NW0ZZ RELEASE CERVICAL SPINAL CORD, OPEN APPROACH: ICD-10-PCS | Performed by: NEUROLOGICAL SURGERY

## 2023-10-10 PROCEDURE — 95868 NDL EMG CRANIAL NRV MUSC BI: CPT | Performed by: NEUROLOGICAL SURGERY

## 2023-10-10 PROCEDURE — 700111 HCHG RX REV CODE 636 W/ 250 OVERRIDE (IP): Mod: JZ | Performed by: NEUROLOGICAL SURGERY

## 2023-10-10 PROCEDURE — 700101 HCHG RX REV CODE 250: Performed by: ANESTHESIOLOGY

## 2023-10-10 PROCEDURE — 770001 HCHG ROOM/CARE - MED/SURG/GYN PRIV*

## 2023-10-10 PROCEDURE — 160041 HCHG SURGERY MINUTES - EA ADDL 1 MIN LEVEL 4: Performed by: NEUROLOGICAL SURGERY

## 2023-10-10 PROCEDURE — 700111 HCHG RX REV CODE 636 W/ 250 OVERRIDE (IP): Mod: JZ | Performed by: ANESTHESIOLOGY

## 2023-10-10 PROCEDURE — 110454 HCHG SHELL REV 250: Performed by: NEUROLOGICAL SURGERY

## 2023-10-10 PROCEDURE — 0RG10A0 FUSION OF CERVICAL VERTEBRAL JOINT WITH INTERBODY FUSION DEVICE, ANTERIOR APPROACH, ANTERIOR COLUMN, OPEN APPROACH: ICD-10-PCS | Performed by: NEUROLOGICAL SURGERY

## 2023-10-10 PROCEDURE — 160035 HCHG PACU - 1ST 60 MINS PHASE I: Performed by: NEUROLOGICAL SURGERY

## 2023-10-10 PROCEDURE — 700102 HCHG RX REV CODE 250 W/ 637 OVERRIDE(OP): Performed by: ANESTHESIOLOGY

## 2023-10-10 PROCEDURE — 95865 NEEDLE EMG LARYNX: CPT | Performed by: NEUROLOGICAL SURGERY

## 2023-10-10 PROCEDURE — 700105 HCHG RX REV CODE 258: Performed by: NEUROLOGICAL SURGERY

## 2023-10-10 DEVICE — IMPLANTABLE DEVICE: Type: IMPLANTABLE DEVICE | Status: FUNCTIONAL

## 2023-10-10 DEVICE — OSTEOCOVE PUTTY 1.25CC (1EA): Type: IMPLANTABLE DEVICE | Status: FUNCTIONAL

## 2023-10-10 RX ORDER — EPHEDRINE SULFATE 50 MG/ML
5 INJECTION, SOLUTION INTRAVENOUS
Status: DISCONTINUED | OUTPATIENT
Start: 2023-10-10 | End: 2023-10-10 | Stop reason: HOSPADM

## 2023-10-10 RX ORDER — DIPHENHYDRAMINE HYDROCHLORIDE 50 MG/ML
25 INJECTION INTRAMUSCULAR; INTRAVENOUS EVERY 6 HOURS PRN
Status: DISCONTINUED | OUTPATIENT
Start: 2023-10-10 | End: 2023-10-11 | Stop reason: HOSPADM

## 2023-10-10 RX ORDER — HYDROMORPHONE HYDROCHLORIDE 1 MG/ML
0.1 INJECTION, SOLUTION INTRAMUSCULAR; INTRAVENOUS; SUBCUTANEOUS
Status: DISCONTINUED | OUTPATIENT
Start: 2023-10-10 | End: 2023-10-10 | Stop reason: HOSPADM

## 2023-10-10 RX ORDER — SODIUM CHLORIDE, SODIUM LACTATE, POTASSIUM CHLORIDE, CALCIUM CHLORIDE 600; 310; 30; 20 MG/100ML; MG/100ML; MG/100ML; MG/100ML
INJECTION, SOLUTION INTRAVENOUS CONTINUOUS
Status: ACTIVE | OUTPATIENT
Start: 2023-10-10 | End: 2023-10-10

## 2023-10-10 RX ORDER — ACETAMINOPHEN 500 MG
1000 TABLET ORAL EVERY 6 HOURS PRN
COMMUNITY
End: 2024-03-24

## 2023-10-10 RX ORDER — ALPRAZOLAM 0.25 MG/1
0.25 TABLET ORAL 2 TIMES DAILY PRN
Status: DISCONTINUED | OUTPATIENT
Start: 2023-10-10 | End: 2023-10-11 | Stop reason: HOSPADM

## 2023-10-10 RX ORDER — MEPERIDINE HYDROCHLORIDE 25 MG/ML
6.25 INJECTION INTRAMUSCULAR; INTRAVENOUS; SUBCUTANEOUS
Status: DISCONTINUED | OUTPATIENT
Start: 2023-10-10 | End: 2023-10-10 | Stop reason: HOSPADM

## 2023-10-10 RX ORDER — ENOXAPARIN SODIUM 100 MG/ML
40 INJECTION SUBCUTANEOUS DAILY
Status: DISCONTINUED | OUTPATIENT
Start: 2023-10-11 | End: 2023-10-11 | Stop reason: HOSPADM

## 2023-10-10 RX ORDER — LABETALOL HYDROCHLORIDE 5 MG/ML
10 INJECTION, SOLUTION INTRAVENOUS
Status: DISCONTINUED | OUTPATIENT
Start: 2023-10-10 | End: 2023-10-11 | Stop reason: HOSPADM

## 2023-10-10 RX ORDER — HYDROCODONE BITARTRATE AND ACETAMINOPHEN 10; 325 MG/1; MG/1
1 TABLET ORAL EVERY 4 HOURS PRN
Status: DISCONTINUED | OUTPATIENT
Start: 2023-10-10 | End: 2023-10-10

## 2023-10-10 RX ORDER — SODIUM CHLORIDE 9 MG/ML
INJECTION, SOLUTION INTRAVENOUS CONTINUOUS
Status: DISCONTINUED | OUTPATIENT
Start: 2023-10-10 | End: 2023-10-11 | Stop reason: HOSPADM

## 2023-10-10 RX ORDER — LIDOCAINE HYDROCHLORIDE 20 MG/ML
INJECTION, SOLUTION EPIDURAL; INFILTRATION; INTRACAUDAL; PERINEURAL PRN
Status: DISCONTINUED | OUTPATIENT
Start: 2023-10-10 | End: 2023-10-10 | Stop reason: SURG

## 2023-10-10 RX ORDER — DIPHENHYDRAMINE HCL 25 MG
25 TABLET ORAL EVERY 6 HOURS PRN
Status: DISCONTINUED | OUTPATIENT
Start: 2023-10-10 | End: 2023-10-11 | Stop reason: HOSPADM

## 2023-10-10 RX ORDER — MIDAZOLAM HYDROCHLORIDE 1 MG/ML
INJECTION INTRAMUSCULAR; INTRAVENOUS PRN
Status: DISCONTINUED | OUTPATIENT
Start: 2023-10-10 | End: 2023-10-10 | Stop reason: SURG

## 2023-10-10 RX ORDER — HALOPERIDOL 5 MG/ML
1 INJECTION INTRAMUSCULAR
Status: DISCONTINUED | OUTPATIENT
Start: 2023-10-10 | End: 2023-10-10 | Stop reason: HOSPADM

## 2023-10-10 RX ORDER — CEFAZOLIN SODIUM 1 G/3ML
INJECTION, POWDER, FOR SOLUTION INTRAMUSCULAR; INTRAVENOUS PRN
Status: DISCONTINUED | OUTPATIENT
Start: 2023-10-10 | End: 2023-10-10 | Stop reason: SURG

## 2023-10-10 RX ORDER — LIDOCAINE HYDROCHLORIDE 40 MG/ML
SOLUTION TOPICAL PRN
Status: DISCONTINUED | OUTPATIENT
Start: 2023-10-10 | End: 2023-10-10 | Stop reason: SURG

## 2023-10-10 RX ORDER — ALBUTEROL SULFATE 90 UG/1
2 AEROSOL, METERED RESPIRATORY (INHALATION) EVERY 6 HOURS PRN
Status: DISCONTINUED | OUTPATIENT
Start: 2023-10-10 | End: 2023-10-11 | Stop reason: HOSPADM

## 2023-10-10 RX ORDER — ACETAMINOPHEN 500 MG
1000 TABLET ORAL ONCE
Status: COMPLETED | OUTPATIENT
Start: 2023-10-10 | End: 2023-10-10

## 2023-10-10 RX ORDER — TIZANIDINE 4 MG/1
2 TABLET ORAL 3 TIMES DAILY PRN
Status: DISCONTINUED | OUTPATIENT
Start: 2023-10-10 | End: 2023-10-11 | Stop reason: HOSPADM

## 2023-10-10 RX ORDER — SUCCINYLCHOLINE CHLORIDE 20 MG/ML
INJECTION INTRAMUSCULAR; INTRAVENOUS PRN
Status: DISCONTINUED | OUTPATIENT
Start: 2023-10-10 | End: 2023-10-10 | Stop reason: SURG

## 2023-10-10 RX ORDER — HYDROMORPHONE HYDROCHLORIDE 1 MG/ML
0.5 INJECTION, SOLUTION INTRAMUSCULAR; INTRAVENOUS; SUBCUTANEOUS
Status: DISCONTINUED | OUTPATIENT
Start: 2023-10-10 | End: 2023-10-11 | Stop reason: HOSPADM

## 2023-10-10 RX ORDER — OXYCODONE HCL 5 MG/5 ML
5 SOLUTION, ORAL ORAL
Status: COMPLETED | OUTPATIENT
Start: 2023-10-10 | End: 2023-10-10

## 2023-10-10 RX ORDER — ENEMA 19; 7 G/133ML; G/133ML
1 ENEMA RECTAL
Status: DISCONTINUED | OUTPATIENT
Start: 2023-10-10 | End: 2023-10-11 | Stop reason: HOSPADM

## 2023-10-10 RX ORDER — HYDRALAZINE HYDROCHLORIDE 20 MG/ML
5 INJECTION INTRAMUSCULAR; INTRAVENOUS
Status: DISCONTINUED | OUTPATIENT
Start: 2023-10-10 | End: 2023-10-10 | Stop reason: HOSPADM

## 2023-10-10 RX ORDER — HYDROMORPHONE HYDROCHLORIDE 1 MG/ML
0.4 INJECTION, SOLUTION INTRAMUSCULAR; INTRAVENOUS; SUBCUTANEOUS
Status: DISCONTINUED | OUTPATIENT
Start: 2023-10-10 | End: 2023-10-10 | Stop reason: HOSPADM

## 2023-10-10 RX ORDER — AMOXICILLIN 250 MG
1 CAPSULE ORAL NIGHTLY
Status: DISCONTINUED | OUTPATIENT
Start: 2023-10-10 | End: 2023-10-11 | Stop reason: HOSPADM

## 2023-10-10 RX ORDER — GABAPENTIN 300 MG/1
300 CAPSULE ORAL ONCE
Status: COMPLETED | OUTPATIENT
Start: 2023-10-10 | End: 2023-10-10

## 2023-10-10 RX ORDER — CALCIUM CARBONATE 500 MG/1
500 TABLET, CHEWABLE ORAL 2 TIMES DAILY
Status: DISCONTINUED | OUTPATIENT
Start: 2023-10-10 | End: 2023-10-11 | Stop reason: HOSPADM

## 2023-10-10 RX ORDER — ZOLMITRIPTAN 2.5 MG/1
5 TABLET, ORALLY DISINTEGRATING ORAL
Status: DISCONTINUED | OUTPATIENT
Start: 2023-10-10 | End: 2023-10-11 | Stop reason: HOSPADM

## 2023-10-10 RX ORDER — POLYETHYLENE GLYCOL 3350 17 G/17G
1 POWDER, FOR SOLUTION ORAL 2 TIMES DAILY PRN
Status: DISCONTINUED | OUTPATIENT
Start: 2023-10-10 | End: 2023-10-11 | Stop reason: HOSPADM

## 2023-10-10 RX ORDER — PROMETHAZINE HYDROCHLORIDE 25 MG/1
12.5-25 SUPPOSITORY RECTAL EVERY 4 HOURS PRN
Status: DISCONTINUED | OUTPATIENT
Start: 2023-10-10 | End: 2023-10-11 | Stop reason: HOSPADM

## 2023-10-10 RX ORDER — DEXAMETHASONE SODIUM PHOSPHATE 4 MG/ML
4 INJECTION, SOLUTION INTRA-ARTICULAR; INTRALESIONAL; INTRAMUSCULAR; INTRAVENOUS; SOFT TISSUE EVERY 6 HOURS
Status: COMPLETED | OUTPATIENT
Start: 2023-10-10 | End: 2023-10-11

## 2023-10-10 RX ORDER — ONDANSETRON 2 MG/ML
4 INJECTION INTRAMUSCULAR; INTRAVENOUS
Status: COMPLETED | OUTPATIENT
Start: 2023-10-10 | End: 2023-10-10

## 2023-10-10 RX ORDER — AMOXICILLIN 250 MG
1 CAPSULE ORAL
Status: DISCONTINUED | OUTPATIENT
Start: 2023-10-10 | End: 2023-10-11 | Stop reason: HOSPADM

## 2023-10-10 RX ORDER — DEXAMETHASONE SODIUM PHOSPHATE 4 MG/ML
INJECTION, SOLUTION INTRA-ARTICULAR; INTRALESIONAL; INTRAMUSCULAR; INTRAVENOUS; SOFT TISSUE PRN
Status: DISCONTINUED | OUTPATIENT
Start: 2023-10-10 | End: 2023-10-10 | Stop reason: SURG

## 2023-10-10 RX ORDER — DOCUSATE SODIUM 100 MG/1
100 CAPSULE, LIQUID FILLED ORAL 2 TIMES DAILY
Status: DISCONTINUED | OUTPATIENT
Start: 2023-10-10 | End: 2023-10-11 | Stop reason: HOSPADM

## 2023-10-10 RX ORDER — GABAPENTIN 400 MG/1
400 CAPSULE ORAL 3 TIMES DAILY
Status: DISCONTINUED | OUTPATIENT
Start: 2023-10-10 | End: 2023-10-11 | Stop reason: HOSPADM

## 2023-10-10 RX ORDER — RIZATRIPTAN BENZOATE 10 MG/1
10 TABLET, ORALLY DISINTEGRATING ORAL
Status: DISCONTINUED | OUTPATIENT
Start: 2023-10-10 | End: 2023-10-10

## 2023-10-10 RX ORDER — CEFAZOLIN SODIUM 1 G/3ML
INJECTION, POWDER, FOR SOLUTION INTRAMUSCULAR; INTRAVENOUS
Status: DISCONTINUED | OUTPATIENT
Start: 2023-10-10 | End: 2023-10-10 | Stop reason: HOSPADM

## 2023-10-10 RX ORDER — ONDANSETRON 2 MG/ML
4 INJECTION INTRAMUSCULAR; INTRAVENOUS EVERY 4 HOURS PRN
Status: DISCONTINUED | OUTPATIENT
Start: 2023-10-10 | End: 2023-10-11 | Stop reason: HOSPADM

## 2023-10-10 RX ORDER — ONDANSETRON 4 MG/1
4 TABLET, ORALLY DISINTEGRATING ORAL EVERY 4 HOURS PRN
Status: DISCONTINUED | OUTPATIENT
Start: 2023-10-10 | End: 2023-10-11 | Stop reason: HOSPADM

## 2023-10-10 RX ORDER — ONDANSETRON 2 MG/ML
INJECTION INTRAMUSCULAR; INTRAVENOUS PRN
Status: DISCONTINUED | OUTPATIENT
Start: 2023-10-10 | End: 2023-10-10 | Stop reason: SURG

## 2023-10-10 RX ORDER — PROCHLORPERAZINE EDISYLATE 5 MG/ML
5-10 INJECTION INTRAMUSCULAR; INTRAVENOUS EVERY 4 HOURS PRN
Status: DISCONTINUED | OUTPATIENT
Start: 2023-10-10 | End: 2023-10-11 | Stop reason: HOSPADM

## 2023-10-10 RX ORDER — OXYCODONE HCL 5 MG/5 ML
10 SOLUTION, ORAL ORAL
Status: COMPLETED | OUTPATIENT
Start: 2023-10-10 | End: 2023-10-10

## 2023-10-10 RX ORDER — OXYCODONE HYDROCHLORIDE 10 MG/1
10 TABLET ORAL EVERY 4 HOURS PRN
Status: DISCONTINUED | OUTPATIENT
Start: 2023-10-10 | End: 2023-10-11 | Stop reason: HOSPADM

## 2023-10-10 RX ORDER — BUPIVACAINE HYDROCHLORIDE AND EPINEPHRINE 5; 5 MG/ML; UG/ML
INJECTION, SOLUTION EPIDURAL; INTRACAUDAL; PERINEURAL
Status: DISCONTINUED | OUTPATIENT
Start: 2023-10-10 | End: 2023-10-10 | Stop reason: HOSPADM

## 2023-10-10 RX ORDER — HYDROMORPHONE HYDROCHLORIDE 1 MG/ML
0.2 INJECTION, SOLUTION INTRAMUSCULAR; INTRAVENOUS; SUBCUTANEOUS
Status: DISCONTINUED | OUTPATIENT
Start: 2023-10-10 | End: 2023-10-10 | Stop reason: HOSPADM

## 2023-10-10 RX ORDER — BISACODYL 10 MG
10 SUPPOSITORY, RECTAL RECTAL
Status: DISCONTINUED | OUTPATIENT
Start: 2023-10-10 | End: 2023-10-11 | Stop reason: HOSPADM

## 2023-10-10 RX ORDER — PROMETHAZINE HYDROCHLORIDE 25 MG/1
12.5-25 TABLET ORAL EVERY 4 HOURS PRN
Status: DISCONTINUED | OUTPATIENT
Start: 2023-10-10 | End: 2023-10-11 | Stop reason: HOSPADM

## 2023-10-10 RX ORDER — DIPHENHYDRAMINE HYDROCHLORIDE 50 MG/ML
12.5 INJECTION INTRAMUSCULAR; INTRAVENOUS
Status: DISCONTINUED | OUTPATIENT
Start: 2023-10-10 | End: 2023-10-10 | Stop reason: HOSPADM

## 2023-10-10 RX ORDER — OXYCODONE HYDROCHLORIDE 5 MG/1
5 TABLET ORAL EVERY 4 HOURS PRN
Status: DISCONTINUED | OUTPATIENT
Start: 2023-10-10 | End: 2023-10-11 | Stop reason: HOSPADM

## 2023-10-10 RX ORDER — SODIUM CHLORIDE, SODIUM LACTATE, POTASSIUM CHLORIDE, CALCIUM CHLORIDE 600; 310; 30; 20 MG/100ML; MG/100ML; MG/100ML; MG/100ML
INJECTION, SOLUTION INTRAVENOUS CONTINUOUS
Status: DISCONTINUED | OUTPATIENT
Start: 2023-10-10 | End: 2023-10-10 | Stop reason: HOSPADM

## 2023-10-10 RX ORDER — METOPROLOL TARTRATE 1 MG/ML
1 INJECTION, SOLUTION INTRAVENOUS
Status: DISCONTINUED | OUTPATIENT
Start: 2023-10-10 | End: 2023-10-10 | Stop reason: HOSPADM

## 2023-10-10 RX ADMIN — ACETAMINOPHEN 1000 MG: 500 TABLET ORAL at 06:09

## 2023-10-10 RX ADMIN — GABAPENTIN 400 MG: 400 CAPSULE ORAL at 15:03

## 2023-10-10 RX ADMIN — HYDROMORPHONE HYDROCHLORIDE 0.2 MG: 1 INJECTION, SOLUTION INTRAMUSCULAR; INTRAVENOUS; SUBCUTANEOUS at 09:46

## 2023-10-10 RX ADMIN — Medication 10 MG: at 07:14

## 2023-10-10 RX ADMIN — PROPOFOL 140 MG: 10 INJECTION, EMULSION INTRAVENOUS at 07:08

## 2023-10-10 RX ADMIN — FENTANYL CITRATE 75 MCG: 50 INJECTION, SOLUTION INTRAMUSCULAR; INTRAVENOUS at 07:41

## 2023-10-10 RX ADMIN — DEXAMETHASONE SODIUM PHOSPHATE 4 MG: 4 INJECTION INTRA-ARTICULAR; INTRALESIONAL; INTRAMUSCULAR; INTRAVENOUS; SOFT TISSUE at 18:21

## 2023-10-10 RX ADMIN — TIZANIDINE 2 MG: 4 TABLET ORAL at 18:20

## 2023-10-10 RX ADMIN — FENTANYL CITRATE 50 MCG: 50 INJECTION, SOLUTION INTRAMUSCULAR; INTRAVENOUS at 07:55

## 2023-10-10 RX ADMIN — FENTANYL CITRATE 50 MCG: 50 INJECTION, SOLUTION INTRAMUSCULAR; INTRAVENOUS at 09:15

## 2023-10-10 RX ADMIN — GABAPENTIN 400 MG: 400 CAPSULE ORAL at 22:22

## 2023-10-10 RX ADMIN — DEXAMETHASONE SODIUM PHOSPHATE 4 MG: 4 INJECTION INTRA-ARTICULAR; INTRALESIONAL; INTRAMUSCULAR; INTRAVENOUS; SOFT TISSUE at 15:03

## 2023-10-10 RX ADMIN — FENTANYL CITRATE 25 MCG: 50 INJECTION, SOLUTION INTRAMUSCULAR; INTRAVENOUS at 07:53

## 2023-10-10 RX ADMIN — ANTACID TABLETS 500 MG: 500 TABLET, CHEWABLE ORAL at 18:20

## 2023-10-10 RX ADMIN — FENTANYL CITRATE 25 MCG: 50 INJECTION, SOLUTION INTRAMUSCULAR; INTRAVENOUS at 09:00

## 2023-10-10 RX ADMIN — Medication 15 MG: at 07:53

## 2023-10-10 RX ADMIN — BENZOCAINE AND MENTHOL 1 LOZENGE: 15; 3.6 LOZENGE ORAL at 16:15

## 2023-10-10 RX ADMIN — SUCCINYLCHOLINE CHLORIDE 100 MG: 20 INJECTION, SOLUTION INTRAMUSCULAR; INTRAVENOUS at 07:08

## 2023-10-10 RX ADMIN — OXYCODONE HYDROCHLORIDE 10 MG: 5 SOLUTION ORAL at 08:54

## 2023-10-10 RX ADMIN — MIDAZOLAM 2 MG: 1 INJECTION, SOLUTION INTRAMUSCULAR; INTRAVENOUS at 07:06

## 2023-10-10 RX ADMIN — ONDANSETRON 4 MG: 2 INJECTION INTRAMUSCULAR; INTRAVENOUS at 07:40

## 2023-10-10 RX ADMIN — LIDOCAINE HYDROCHLORIDE 50 MG: 20 INJECTION, SOLUTION EPIDURAL; INFILTRATION; INTRACAUDAL at 07:08

## 2023-10-10 RX ADMIN — OXYCODONE HYDROCHLORIDE 10 MG: 10 TABLET ORAL at 16:15

## 2023-10-10 RX ADMIN — ROCURONIUM BROMIDE 3 MG: 10 INJECTION, SOLUTION INTRAVENOUS at 07:08

## 2023-10-10 RX ADMIN — FENTANYL CITRATE 25 MCG: 50 INJECTION, SOLUTION INTRAMUSCULAR; INTRAVENOUS at 08:07

## 2023-10-10 RX ADMIN — HYDROMORPHONE HYDROCHLORIDE 0.5 MG: 1 INJECTION, SOLUTION INTRAMUSCULAR; INTRAVENOUS; SUBCUTANEOUS at 18:20

## 2023-10-10 RX ADMIN — FENTANYL CITRATE 25 MCG: 50 INJECTION, SOLUTION INTRAMUSCULAR; INTRAVENOUS at 08:54

## 2023-10-10 RX ADMIN — DEXAMETHASONE SODIUM PHOSPHATE 8 MG: 4 INJECTION INTRA-ARTICULAR; INTRALESIONAL; INTRAMUSCULAR; INTRAVENOUS; SOFT TISSUE at 07:34

## 2023-10-10 RX ADMIN — SODIUM CHLORIDE: 9 INJECTION, SOLUTION INTRAVENOUS at 15:15

## 2023-10-10 RX ADMIN — TIZANIDINE 2 MG: 4 TABLET ORAL at 09:44

## 2023-10-10 RX ADMIN — CEFAZOLIN 2 G: 2 INJECTION, POWDER, FOR SOLUTION INTRAMUSCULAR; INTRAVENOUS at 16:19

## 2023-10-10 RX ADMIN — ALPRAZOLAM 0.25 MG: 0.25 TABLET ORAL at 15:14

## 2023-10-10 RX ADMIN — FENTANYL CITRATE 25 MCG: 50 INJECTION, SOLUTION INTRAMUSCULAR; INTRAVENOUS at 08:26

## 2023-10-10 RX ADMIN — CEFAZOLIN 2 G: 1 INJECTION, POWDER, FOR SOLUTION INTRAMUSCULAR; INTRAVENOUS at 07:19

## 2023-10-10 RX ADMIN — HALOPERIDOL LACTATE 1 MG: 5 INJECTION, SOLUTION INTRAMUSCULAR at 09:20

## 2023-10-10 RX ADMIN — LIDOCAINE HYDROCHLORIDE 4 ML: 40 SOLUTION TOPICAL at 07:09

## 2023-10-10 RX ADMIN — GABAPENTIN 300 MG: 300 CAPSULE ORAL at 06:09

## 2023-10-10 RX ADMIN — LIDOCAINE HYDROCHLORIDE 50 MG: 20 INJECTION, SOLUTION EPIDURAL; INFILTRATION; INTRACAUDAL at 08:32

## 2023-10-10 RX ADMIN — PROPOFOL 50 MCG/KG/MIN: 10 INJECTION, EMULSION INTRAVENOUS at 07:30

## 2023-10-10 RX ADMIN — ONDANSETRON 4 MG: 2 INJECTION INTRAMUSCULAR; INTRAVENOUS at 08:56

## 2023-10-10 RX ADMIN — Medication 25 MG: at 07:41

## 2023-10-10 RX ADMIN — FENTANYL CITRATE 25 MCG: 50 INJECTION, SOLUTION INTRAMUSCULAR; INTRAVENOUS at 07:06

## 2023-10-10 RX ADMIN — SERTRALINE 150 MG: 100 TABLET, FILM COATED ORAL at 15:03

## 2023-10-10 RX ADMIN — DOCUSATE SODIUM 100 MG: 100 CAPSULE, LIQUID FILLED ORAL at 18:20

## 2023-10-10 RX ADMIN — SODIUM CHLORIDE, POTASSIUM CHLORIDE, SODIUM LACTATE AND CALCIUM CHLORIDE: 600; 310; 30; 20 INJECTION, SOLUTION INTRAVENOUS at 06:17

## 2023-10-10 ASSESSMENT — PAIN DESCRIPTION - PAIN TYPE
TYPE: SURGICAL PAIN

## 2023-10-10 ASSESSMENT — FIBROSIS 4 INDEX
FIB4 SCORE: 0.64
FIB4 SCORE: 0.64

## 2023-10-10 NOTE — OR SURGEON
Immediate Post OP Note    PreOp Diagnosis: C5-6 DDD, bilateral radiculopathy.       PostOp Diagnosis: Same.       Procedure(s):  CERVICAL 5-6 ANTERIOR CERVICAL DISCECTOMY AND FUSION - Wound Class: Clean    Surgeon(s):  Micheal Rodriguez M.D.    Anesthesiologist/Type of Anesthesia:  Anesthesiologist: Loco Manning M.D./General    Surgical Staff:  Assistant: Leroy De La Torre P.A.-C.  Circulator: Ashley Garcia R.N.  Scrub Person: Vianney Steinberg  Radiology Technologist: Kendra Cervantes; Concepcion Verduzco    Specimens removed if any:  * No specimens in log *    Estimated Blood Loss: < 30 cc     Findings: C5-6 DDD, see dictation.     Complications: None.         10/10/2023 8:50 AM Leroy De La Torre P.A.-C.

## 2023-10-10 NOTE — ANESTHESIA PREPROCEDURE EVALUATION
Case: [id number] Date/Time: 10/10/23 0645    Procedure: CERVICAL 5-6 ANTERIOR CERVICAL DISCECTOMY AND FUSION    Pre-op diagnosis: CERVICAL STENOSIS    Location: [facility] OR 07 / SURGERY [facility]    Surgeons: Micheal Rodriguez M.D.        Smoker 30 year 1ppd  Recent cough  MARY  Relevant Problems   No relevant active problems       Physical Exam    Airway   Mallampati: II  TM distance: >3 FB  Neck ROM: full       Cardiovascular - normal exam  Rhythm: regular  Rate: normal  (-) murmur     Dental - normal exam  (+) upper dentures           Pulmonary - normal exam  Breath sounds clear to auscultation     Abdominal    Neurological - normal exam         Other findings: Missing some lower teeth            Anesthesia Plan    ASA 2       Plan - general       Airway plan will be ETT          Induction: intravenous    Postoperative Plan: Postoperative administration of opioids is intended.    Pertinent diagnostic labs and testing reviewed    Informed Consent:    Anesthetic plan and risks discussed with patient.    Use of blood products discussed with: patient whom consented to blood products.

## 2023-10-10 NOTE — ANESTHESIA PROCEDURE NOTES
Airway    Date/Time: 10/10/2023 7:09 AM    Performed by: Loco Manning M.D.  Authorized by: Loco Manning M.D.    Location:  OR  Urgency:  Elective  Indications for Airway Management:  Anesthesia      Spontaneous Ventilation: absent    Sedation Level:  Deep  Preoxygenated: Yes    Patient Position:  Sniffing  Final Airway Type:  Endotracheal airway  Final Endotracheal Airway:  ETT  Cuffed: Yes    Technique Used for Successful ETT Placement:  Direct laryngoscopy    Insertion Site:  Oral  Blade Type:  Verduzco  Laryngoscope Blade/Videolaryngoscope Blade Size:  2  ETT Size (mm):  7.0  Measured from:  Teeth  ETT to Teeth (cm):  20  Placement Verified by: auscultation and capnometry    Cormack-Lehane Classification:  Grade I - full view of glottis  Number of Attempts at Approach:  1   NIM ET with blue strip at cords

## 2023-10-10 NOTE — OP REPORT
DATE OF SERVICE:  10/10/2023     PREOPERATIVE DIAGNOSES:  1.  C5-6 degenerative disk disease.  2.  C5-C6 cervical stenosis.  3.  Left C6 radiculopathy.  4.  Spinal cord compression with cervical myelopathy.     POSTOPERATIVE DIAGNOSES:  1.  C5-6 degenerative disk disease.  2.  C5-C6 cervical stenosis.  3.  Left C6 radiculopathy.  4.  Spinal cord compression with cervical myelopathy.     PROCEDURES:  1.  C5-6 anterior cervical diskectomy with bilateral foraminotomies.  2.  Partial corpectomy, C5.  3.  Partial corpectomy, C6.  4.  C5-C6 arthrodesis with SeaSpine titanium interbody cage with local   morselized autograft and OsteoCurrent arthrodesis.  5.  Insertion of titanium cage at one level.  6.  C5-6 anterior cervical plating with SeaSpine plate and screws.  7.  Microscope for microdissection of spinal canal.  8.  SSEPs and EMG monitoring, motor evoked potential monitoring, recurrent   laryngeal nerve monitoring performed by neuromonitoring associates, which   remained stable throughout.     SURGEON:  Micheal Rodriguez MD, Neurosurgery and Spine Surgery     ASSISTANT:  Leroy De La Torre PA-C     ANESTHESIA:  General endotracheal anesthesia.     ANESTHESIOLOGIST:  Loco Manning MD     COMPLICATIONS:  None.     ESTIMATED BLOOD LOSS:  Less than 10 mL.     PREOPERATIVE NOTE:  This extremely pleasant 43-year-old lady who presents with   neck pain and upper extremity arm pain, weakness and paresthesia consistent   with left C6 radiculopathy.  MRI showed a large C5-6 disk herniation,   indenting and flattening the spinal cord.  The patient clearly failed   conservative care.  I discussed surgical procedure, alternatives, goals,   risks, benefits, and complications in detail with the patient.  The patient   understood and consented to surgery for above listed procedure.  Details well   contained in the office visit notes.     DESCRIPTION OF PROCEDURE:  The patient was brought to the operating room and   placed under general  endotracheal anesthesia.  The patient was placed supine   on the operating table with care taken to avoid the bony prominences and   peripheral nerves.  Anterior cervical area was prepped and draped in the usual   sterile fashion.  Following localization of cross-table fluoroscopy, a small   transverse cervical incision was made right of midline in a neck crease for   cosmetic purposes and the platysma was divided in vertical muscle splitting   fashion.  Blunt and sharp dissection was used to identify the ventral cervical   spine between the carotid sheath and the esophagus.  The longus colli muscles   were swept laterally and self-retaining retractors applied.  Intraoperative   x-ray confirmed appropriate levels.  The retractor was placed accordingly and   distractor pins placed in bodies of C5, C6.  I incised the disk at C5-C6,   removed the disk from the endplates.  Disk space was quite arthrodesed and   collapsed down with the huge osteophytes.  Partial corpectomies of C5 and   partial corpectomy C6 was required and completed in standard fashion, removing   50% of vertebral bodies.  Posterior osteophytes were removed.  Ligament was   removed.  Bilateral foraminotomies at C5-C6 were completed, ensuring complete   decompression of spinal cord.  The wound was irrigated, hemostasis achieved.    I placed thrombin Gelfoam in the epidural gutters for hemostasis.  I then   templated for appropriate-sized lordotic cage.  Appropriate lordotic cage from   [facility] was chosen, packed with local morselized autograft and OsteoCurrent   arthrodesis and delivered under distraction between the body of C5-6 for an   excellent A plus fit.  Anterior plating system was secured with screws in C5   and C6.  Locking mechanism was engaged.  AP and lateral x-rays confirmed   excellent position of the cage and the plate in the midline with excellent   restored lordosis.  The wound was irrigated.  Immaculate hemostasis was   achieved.  The  wound was closed with 3-0 Vicryl to platysma, 3-0 Vicryl   subcuticular with a single Steri-Strip to skin.  Small sterile dressing was   applied.  Swabs, needles, and instruments correct by two-count.  No   complications were encountered.  The patient tolerated the procedure, stable   and transferred to recovery room.  The patient will be observed at [facility] until she meets discharge criteria.  The patient will   follow up at [facility] in 2 weeks and 6 weeks' time as arranged.        ______________________________  ROMÁN DUMONT MD    JJL/ELMER    DD:  10/10/2023 08:46  DT:  10/10/2023 09:10    Job#:  [id number]    CC:BERTHA Charles, KEVIN

## 2023-10-10 NOTE — ANESTHESIA POSTPROCEDURE EVALUATION
Patient: Edith Glass    Procedure Summary     Date: 10/10/23 Room / Location: [place] [address] / SURGERY [facility]    Anesthesia Start: 0703 Anesthesia Stop: 0848    Procedure: CERVICAL 5-6 ANTERIOR CERVICAL DISCECTOMY AND FUSION (Neck) Diagnosis: (CERVICAL STENOSIS)    Surgeons: Micheal Rodriguez M.D. Responsible Provider: Loco Manning M.D.    Anesthesia Type: general ASA Status: 2          Final Anesthesia Type: general  Last vitals  BP   Blood Pressure: 105/59    Temp   36.1 °C (97 °F)    Pulse   74   Resp   20    SpO2   95 %      Anesthesia Post Evaluation    Patient location during evaluation: PACU  Patient participation: complete - patient participated  Level of consciousness: awake and alert    Airway patency: patent  Anesthetic complications: no  Cardiovascular status: hemodynamically stable  Respiratory status: acceptable  Hydration status: euvolemic    PONV: none          No notable events documented.     Nurse Pain Score: 4 (NPRS)

## 2023-10-10 NOTE — OR NURSING
0848: Pt arrives to PACU asleep and calm. VSS. Anterior neck dressing CDI and soft.     1245: Pt sitting up drinking water. Denies nausea and states pain is tolerable. Dressing CDI and soft. Report called to Debbie TROY. Daughter called and updated.

## 2023-10-10 NOTE — ANESTHESIA TIME REPORT
Anesthesia Start and Stop Event Times     Date Time Event    10/10/2023 0649 Ready for Procedure     0703 Anesthesia Start     0848 Anesthesia Stop        Responsible Staff  10/10/23    Name Role Begin End    Loco Manning M.D. Anesth 0703 0848        Overtime Reason:  no overtime (within assigned shift)    Comments:

## 2023-10-10 NOTE — PROGRESS NOTES
4 Eyes Skin Assessment Completed by SYDNI Lewis and SYDNI Aguilera.    Head WDL  Ears WDL  Nose WDL  Mouth WDL  Neck Incision  Breast/Chest WDL  Shoulder Blades WDL  Spine WDL  (R) Arm/Elbow/Hand WDL  (L) Arm/Elbow/Hand WDL  Abdomen WDL  Groin WDL  Scrotum/Coccyx/Buttocks WDL  (R) Leg WDL  (L) Leg WDL  (R) Heel/Foot/Toe WDL  (L) Heel/Foot/Toe WDL          Devices In Places Pulse Ox, SCD's, and Nasal Cannula      Interventions In Place Pillows    Possible Skin Injury No    Pictures Uploaded Into Epic N/A  Wound Consult Placed N/A  RN Wound Prevention Protocol Ordered No

## 2023-10-11 VITALS
SYSTOLIC BLOOD PRESSURE: 107 MMHG | RESPIRATION RATE: 16 BRPM | BODY MASS INDEX: 26.35 KG/M2 | HEIGHT: 69 IN | TEMPERATURE: 97.9 F | HEART RATE: 63 BPM | WEIGHT: 177.91 LBS | OXYGEN SATURATION: 93 % | DIASTOLIC BLOOD PRESSURE: 67 MMHG

## 2023-10-11 PROCEDURE — 700105 HCHG RX REV CODE 258: Performed by: PHYSICIAN ASSISTANT

## 2023-10-11 PROCEDURE — 700102 HCHG RX REV CODE 250 W/ 637 OVERRIDE(OP): Performed by: PHYSICIAN ASSISTANT

## 2023-10-11 PROCEDURE — 97165 OT EVAL LOW COMPLEX 30 MIN: CPT

## 2023-10-11 PROCEDURE — A9270 NON-COVERED ITEM OR SERVICE: HCPCS | Performed by: PHYSICIAN ASSISTANT

## 2023-10-11 PROCEDURE — 97162 PT EVAL MOD COMPLEX 30 MIN: CPT

## 2023-10-11 PROCEDURE — 700111 HCHG RX REV CODE 636 W/ 250 OVERRIDE (IP): Performed by: PHYSICIAN ASSISTANT

## 2023-10-11 RX ORDER — GABAPENTIN 600 MG/1
600 TABLET ORAL 3 TIMES DAILY
Qty: 90 TABLET | Refills: 0 | Status: SHIPPED | OUTPATIENT
Start: 2023-10-11 | End: 2023-11-10

## 2023-10-11 RX ORDER — TIZANIDINE 2 MG/1
2 TABLET ORAL 3 TIMES DAILY PRN
Qty: 90 TABLET | Refills: 3 | Status: SHIPPED | OUTPATIENT
Start: 2023-10-11

## 2023-10-11 RX ORDER — OXYCODONE HYDROCHLORIDE 5 MG/1
5 TABLET ORAL
Qty: 42 TABLET | Refills: 0 | Status: SHIPPED | OUTPATIENT
Start: 2023-10-11 | End: 2023-10-18

## 2023-10-11 RX ADMIN — GABAPENTIN 400 MG: 400 CAPSULE ORAL at 05:52

## 2023-10-11 RX ADMIN — OXYCODONE HYDROCHLORIDE 10 MG: 10 TABLET ORAL at 08:56

## 2023-10-11 RX ADMIN — OXYCODONE HYDROCHLORIDE 10 MG: 10 TABLET ORAL at 00:09

## 2023-10-11 RX ADMIN — DOCUSATE SODIUM 100 MG: 100 CAPSULE, LIQUID FILLED ORAL at 05:53

## 2023-10-11 RX ADMIN — CEFAZOLIN 2 G: 2 INJECTION, POWDER, FOR SOLUTION INTRAMUSCULAR; INTRAVENOUS at 00:19

## 2023-10-11 RX ADMIN — SERTRALINE 150 MG: 100 TABLET, FILM COATED ORAL at 05:52

## 2023-10-11 RX ADMIN — TIZANIDINE 2 MG: 4 TABLET ORAL at 07:26

## 2023-10-11 RX ADMIN — DEXAMETHASONE SODIUM PHOSPHATE 4 MG: 4 INJECTION INTRA-ARTICULAR; INTRALESIONAL; INTRAMUSCULAR; INTRAVENOUS; SOFT TISSUE at 00:10

## 2023-10-11 ASSESSMENT — COGNITIVE AND FUNCTIONAL STATUS - GENERAL
WALKING IN HOSPITAL ROOM: A LITTLE
STANDING UP FROM CHAIR USING ARMS: A LITTLE
DRESSING REGULAR LOWER BODY CLOTHING: A LITTLE
CLIMB 3 TO 5 STEPS WITH RAILING: A LITTLE
TOILETING: A LITTLE
SUGGESTED CMS G CODE MODIFIER MOBILITY: CJ
MOBILITY SCORE: 21
SUGGESTED CMS G CODE MODIFIER DAILY ACTIVITY: CK
DRESSING REGULAR UPPER BODY CLOTHING: A LITTLE
DAILY ACTIVITIY SCORE: 19
PERSONAL GROOMING: A LITTLE
HELP NEEDED FOR BATHING: A LITTLE

## 2023-10-11 ASSESSMENT — ACTIVITIES OF DAILY LIVING (ADL): TOILETING: INDEPENDENT

## 2023-10-11 ASSESSMENT — GAIT ASSESSMENTS
DISTANCE (FEET): 250
GAIT LEVEL OF ASSIST: SUPERVISED
DEVIATION: BRADYKINETIC

## 2023-10-11 ASSESSMENT — PAIN DESCRIPTION - PAIN TYPE
TYPE: ACUTE PAIN;SURGICAL PAIN
TYPE: ACUTE PAIN;SURGICAL PAIN

## 2023-10-11 NOTE — CARE PLAN
The patient is Stable - Low risk of patient condition declining or worsening         Progress made toward(s) clinical / shift goals:    Problem: Pain - Standard  Goal: Alleviation of pain or a reduction in pain to the patient’s comfort goal  Outcome: Progressing   Administer pain medication as ordered  Problem: Knowledge Deficit - Standard  Goal: Patient and family/care givers will demonstrate understanding of plan of care, disease process/condition, diagnostic tests and medications  Outcome: Progressing     Update whiteboard, answered all questions  Patient is not progressing towards the following goals:

## 2023-10-11 NOTE — THERAPY
Occupational Therapy   Initial Evaluation     Patient Name: Edith Glass  Age:  43 y.o., Sex:  female  Medical Record #: [id number]  Today's Date: 10/11/2023     Precautions  Precautions: Fall Risk, Spinal / Back Precautions   Comments: soft collar PRN    Assessment    Patient is 43 y.o. female s/p C5-6 ACDF. Other pertinent medical history includes progressive neck pain with UE pain, paresthesias, and weakness. Pt seen for OT evaluation. Pt donned/doffed socks, donned/doffed collar, stood to wash hands/face, stood to brush teeth, and performed toilet transfer w/ supv-SBA. Pt lives with her fiance (currently getting surgery), 12 year old, 13 year old, and adult children. Pt educated regarding the role of OT, brace wear/care, and spinal precautions in relation to ADLs. Patient will not be actively followed for occupational therapy services at this time, however may be seen if requested by physician for 1 more visit within 30 days to address any discharge or equipment needs.      Plan    Occupational Therapy Initial Treatment Plan   Duration: Discharge Needs Only    DC Equipment Recommendations: Tub / Shower Seat  Discharge Recommendations: Anticipate that the patient will have no further occupational therapy needs after discharge from the hospital      Objective     10/11/23 1003   Prior Living Situation   Prior Services Home-Independent   Housing / Facility 1 Story House   Steps Into Home 2   Steps In Home 0   Rail Both Rail (Steps into Home)   Bathroom Set up Bathtub / Shower Combination   Equipment Owned None   Lives with - Patient's Self Care Capacity Significant Other;Child Less than 18 Years of Age;Adult Children   Comments Pt lives with her fiance who is currenty admitted to the hospital and had a foot surgery today. Pt lives with her 12 year old and 14 year old children. She reported having an older daughter who is home sometimes, but may be able to assist with rides.   Prior Level of ADL Function    Self Feeding Independent   Grooming / Hygiene Independent   Bathing Independent   Dressing Independent   Toileting Independent   Prior Level of IADL Function   Medication Management Independent   Laundry Requires Assist   Kitchen Mobility Independent   Finances Independent   Home Management Requires Assist   Shopping Independent   Prior Level Of Mobility Independent Without Device in Community;Independent Without Device in Home   Driving / Transportation Driving Independent   Occupation (Pre-Hospital Vocational)   (caregiver for people with intellectual disabilities)   Precautions   Precautions Fall Risk;Spinal / Back Precautions    Comments soft collar PRN   Pain   Pain Scales 0 to 10 Scale    Pain 0 - 10 Group   Therapist Pain Assessment Nurse Notified;Post Activity Pain Same as Prior to Activity  (not rated, agreeable to eval)   Non Verbal Descriptors   Non Verbal Scale  Calm   Cognition    Cognition / Consciousness WDL   Level of Consciousness Alert   Comments Pleasant, cooperative, receptive to education   Passive ROM Upper Body   Passive ROM Upper Body WDL   Active ROM Upper Body   Active ROM Upper Body  WDL   Dominant Hand Right   Strength Upper Body   Upper Body Strength  WDL   Sensation Upper Body   Upper Extremity Sensation  WDL   Upper Body Muscle Tone   Upper Body Muscle Tone  WDL   Coordination Upper Body   Comments slight incoordination to L UE, reported improved   Balance Assessment   Sitting Balance (Static) Good   Sitting Balance (Dynamic) Good   Standing Balance (Static) Fair +   Standing Balance (Dynamic) Fair +   Weight Shift Sitting Good   Weight Shift Standing Good   Comments w/ no AD   Bed Mobility    Comments up to chair pre/post   ADL Assessment   Grooming Standby Assist;Standing  (washed hands, face, and brushed teeth)   Upper Body Dressing Supervision  (don/doff collar)   Lower Body Dressing Standby Assist  (don/doff socks)   Toileting   (toilet transfer only)   Functional Mobility   Sit  to Stand Supervised   Bed, Chair, Wheelchair Transfer Supervised   Toilet Transfers Supervised   Transfer Method Stand Step   Mobility chair>bathroom>chair   Comments w/ no AD   Visual Perception   Visual Perception  Not Tested   Activity Tolerance   Sitting in Chair up to chair pre/post   Sitting Edge of Bed NT   Standing >5 min   Comments no overt pain or fatigue,  no noted increased work of breathing   Education Group   Education Provided Role of Occupational Therapist;Brace Wear and Care;Spinal Precautions   Role of Occupational Therapist Patient Response Patient;Acceptance;Explanation;Verbal Demonstration   Spinal Precautions Patient Response Patient;Acceptance;Explanation;Verbal Demonstration;Demonstration;Action Demonstration;Handout   Brace Wear & Care Patient Response Patient;Acceptance;Explanation;Demonstration;Verbal Demonstration;Action Demonstration

## 2023-10-11 NOTE — PROGRESS NOTES
Neurosurgery Progress Note    Subjective:  POD#1 C5-6 ACDF     Doing well today   Reports post surg neck soreness  Radicular sxs improved  No drain      Exam:  Pleasant and cooperative   Voice strong   Soft collar worn  Dressing c/d/I  Upper ext strength 5/5  No hoffmans  Sensation intact to upper exts      BP  Min: 99/64  Max: 120/81  Pulse  Av.6  Min: 63  Max: 100  Resp  Av.2  Min: 10  Max: 20  Temp  Av.5 °C (97.7 °F)  Min: 35.9 °C (96.6 °F)  Max: 36.9 °C (98.4 °F)  SpO2  Av.1 %  Min: 90 %  Max: 96 %    No data recorded                      Intake/Output                         10/10/23 0700 - 10/11/23 0659 10/11/23 0700 - 10/12/23 0659     [id number] 3082-8415 Total [id number] 8434-4572 Total                 Intake    I.V.  900  -- 900  --  -- --    Volume (mL) (lactated ringers infusion) 900 -- 900 -- -- --    Total Intake 900 -- 900 -- -- --       Output    Urine  600  -- 600  --  -- --    Number of Times Voided 1 x -- 1 x -- -- --    Urine Void (mL) 600 -- 600 -- -- --    Blood  40  -- 40  --  -- --    Est. Blood Loss 40 -- 40 -- -- --    Total Output 640 -- 640 -- -- --       Net I/O     260 -- 260 -- -- --              Intake/Output Summary (Last 24 hours) at 10/11/2023 0946  Last data filed at 10/10/2023 1300  Gross per 24 hour   Intake --   Output 600 ml   Net -600 ml             albuterol  2 Puff Q6HRS PRN    gabapentin  400 mg TID    sertraline  150 mg DAILY    Pharmacy Consult Request  1 Each PHARMACY TO DOSE    MD ALERT...DO NOT ADMINISTER NSAIDS or ASPIRIN unless ORDERED By Neurosurgery  1 Each PRN    docusate sodium  100 mg BID    senna-docusate  1 Tablet Nightly    senna-docusate  1 Tablet Q24HRS PRN    polyethylene glycol/lytes  1 Packet BID PRN    magnesium hydroxide  30 mL QDAY PRN    bisacodyl  10 mg Q24HRS PRN    sodium phosphate  1 Each Once PRN    NS   Continuous    enoxaparin (LOVENOX) injection  40 mg DAILY AT 1800    diphenhydrAMINE  25 mg Q6HRS PRN    Or     diphenhydrAMINE  25 mg Q6HRS PRN    ondansetron  4 mg Q4HRS PRN    ondansetron  4 mg Q4HRS PRN    promethazine  12.5-25 mg Q4HRS PRN    promethazine  12.5-25 mg Q4HRS PRN    prochlorperazine  5-10 mg Q4HRS PRN    tizanidine  2 mg TID PRN    ALPRAZolam  0.25 mg BID PRN    labetalol  10 mg Q HOUR PRN    benzocaine-menthol  1 Lozenge Q2HRS PRN    calcium carbonate  500 mg BID    oxyCODONE immediate-release  5 mg Q4HRS PRN    HYDROmorphone  0.5 mg Q3HRS PRN    ZOLMitriptan  5 mg Q2HRS PRN    oxyCODONE immediate-release  10 mg Q4HRS PRN       Assessment and Plan:  Hospital day # 2  POD# 1  Doing well today  Stable for d/c home  Rx sent to [facility] pharmacy

## 2023-10-11 NOTE — PROGRESS NOTES
Discharge orders acknowledged, Pt aware and compliant with new orders, D/C teaching completed, Pt able to verbalize needs and complaint with discharge orders. Personal belongings in pt possession. Medication sent to pt's pharmacy. PIV removed.  Pt escorted off unit via wheelchair with assistance from CNA.

## 2023-10-11 NOTE — CARE PLAN
The patient is Stable - Low risk of patient condition declining or worsening    Shift Goals  Clinical Goals: pain control, mobilize  Patient Goals: discharge    Progress made toward(s) clinical / shift goals:    Problem: Pain - Standard  Goal: Alleviation of pain or a reduction in pain to the patient’s comfort goal  Outcome: Progressing   Oxy given PRN with +results. Educated pt on importance of pain control- pt verbalized understanding.    Problem: Knowledge Deficit - Standard  Goal: Patient and family/care givers will demonstrate understanding of plan of care, disease process/condition, diagnostic tests and medications  Outcome: Progressing   POC discussed-pt verbalized understanding.    Patient is not progressing towards the following goals:

## 2023-10-11 NOTE — THERAPY
Physical Therapy   Initial Evaluation     Patient Name: Edith Glass  Age:  43 y.o., Sex:  female  Medical Record #: [id number]  Today's Date: 10/11/2023     Precautions  Precautions: Fall Risk;Spinal / Back Precautions   Comments: Home Lifting Restrictions: 10 lbs, soft cervical collar PRN    Assessment  Patient is 43 y.o. female s/p CERVICAL 5-6 ANTERIOR CERVICAL DISCECTOMY AND FUSION. Pt doing well with mobility within spinal precautions and without AD. Pt c/o muscle spasms from incision to shoulders to pectoral area. Provided hot pack for pectoral area for comfort and education to not put it on or near incision. No further PT needs.     Plan    Physical Therapy Initial Treatment Plan   Duration: Evaluation only    DC Equipment Recommendations: None  Discharge Recommendations: Recommend outpatient physical therapy services to address higher level deficits       Subjective    Pt resting in bed. Agreed to PT.      Objective       10/11/23 0903   Initial Contact Note    Initial Contact Note Order Received and Verified, Evaluation Only - Patient Does Not Require Further Acute Physical Therapy at this Time.  However, May Benefit from Post Acute Therapy for Higher Level Functional Deficits.   Precautions   Precautions Fall Risk;Spinal / Back Precautions    Comments Home Lifting Restrictions: 10 lbs, soft cervical collar PRN   Pain 0 - 10 Group   Location Neck;Throat;Chest   Location Orientation Anterior   Therapist Pain Assessment Post Activity Pain Same as Prior to Activity;Nurse Notified   Prior Living Situation   Prior Services Home-Independent   Housing / Facility 1 Story House   Steps Into Home 2   Steps In Home 0   Rail Both Rail (Steps into Home)   Equipment Owned None   Lives with - Patient's Self Care Capacity Significant Other;Child Less than 18 Years of Age;Adult Children   Comments pt lives with her fiance who is currently a pt and going to have surgery on his foot today. Lives with 2 children in  their teens and an older daughter who works   Cognition    Cognition / Consciousness WDL   Level of Consciousness Alert   Active ROM Lower Body    Active ROM Lower Body  WDL   Strength Lower Body   Lower Body Strength  WDL   Coordination Lower Body    Coordination Lower Body  WDL   Balance Assessment   Sitting Balance (Static) Good   Sitting Balance (Dynamic) Good   Standing Balance (Static) Fair +   Standing Balance (Dynamic) Fair +   Weight Shift Sitting Good   Weight Shift Standing Good   Bed Mobility    Supine to Sit Supervised  (log roll to left, bed flat, no rails)   Scooting Supervised   Rolling Supervised   Gait Analysis   Gait Level Of Assist Supervised   Assistive Device None   Distance (Feet) 250   Deviation Bradykinetic   # of Stairs Climbed 3  (bilat rails)   Level of Assist with Stairs Supervised   Functional Mobility   Sit to Stand Supervised   Bed, Chair, Wheelchair Transfer Supervised   Toilet Transfers Supervised   Transfer Method Stand Step   How much difficulty does the patient currently have...   Turning over in bed (including adjusting bedclothes, sheets and blankets)? 4   Sitting down on and standing up from a chair with arms (e.g., wheelchair, bedside commode, etc.) 4   Moving from lying on back to sitting on the side of the bed? 4   How much help from another person does the patient currently need...   Moving to and from a bed to a chair (including a wheelchair)? 3   Need to walk in a hospital room? 3   Climbing 3-5 steps with a railing? 3   6 clicks Mobility Score 21   Education Group   Education Provided Cervical Precautions;Role of Physical Therapist;Gait Training;Stair Training;Brace Wear and Care   Cervical Precautions Patient Response Patient;Acceptance;Explanation;Demonstration;Handout;Teach Back;Verbal Demonstration   Role of Physical Therapist Patient Response Patient;Acceptance;Explanation;Verbal Demonstration   Gait Training Patient Response  Patient;Acceptance;Explanation;Demonstration;Action Demonstration   Stair Training Patient Response Patient;Acceptance;Explanation;Action Demonstration   Brace Wear & Care Patient Response Patient;Acceptance;Explanation;Action Demonstration  (pt able to don soft collar on own)   Physical Therapy Initial Treatment Plan    Duration Evaluation only   Anticipated Discharge Equipment and Recommendations   DC Equipment Recommendations None   Discharge Recommendations Recommend outpatient physical therapy services to address higher level deficits   Interdisciplinary Plan of Care Collaboration   IDT Collaboration with  Nursing   Patient Position at End of Therapy Seated;Call Light within Reach;Tray Table within Reach;Phone within Reach

## 2023-10-11 NOTE — DISCHARGE SUMMARY
Date of Admission: 10/10/2023.  Date of Discharge: 10/11/2023.    Discharge Diagnosis: cervical spondylosis.    Surgery Performed: C5-6 ACDF .    Complications: none.    Reason for Admission: This is a pleasant 43 -year-old female who gives history of progressive neck pain with upper extremtiy pain, paresthesias and weakness. The patient has tried and failed extensive conservative management. Their preoperative work-up showed significant degenerative changes at C5-6 and the patient was hereby indicated for the above surgery. The patient understood risks versus benefits and treatment alternatives and elected to proceed with the operation.    Hospital Course: The patient was admitted on the day of surgery and underwent the above surgery without complication.  After surgery the patient was transferred to the [facility].  Here on the [facility], the patient has made a good recovery postoperatively. Now, on postoperative day #1, the patient's pain is well controlled on oral pain medications.  The patient is ambulating, voiding, tolerating oral food and medications well. Motor exam is 5/5. The patient's incision is clean, dry, and intact. The surgical drain has been removed. The patient is now cleared for discharge home under stable condition after an uneventful hospital stay.    Discharge Instructions: The patient is to ambulate as much as tolerated.   The patient should avoid repetitive bending at the waist. They should avoid pushing, pulling or lifting greater than 15 pounds.   The patient should avoid repetitive over the head arm movements. They should wear their  brace when out of bed. It is okay to shower but the patient is not to submerge the wound.  The patient shoud ice their incision for 10-15 minutes multiple times per day. It is okay to drive in 2 weeks' time or when comfortable and when no longer taking narcotic pain medication. They should take over-the-counter stool softeners while taking narcotic  pain medications. The patient is to eat a normal diet as tolerated. The patient has a postoperative appointment in 2 weeks' time at [facility]. The patient should call our office or go to the nearest emergency department with any emergent changes. The patient was given these discharge instructions verbally and voiced understanding of them.      Discharge Medications: In addition to the patient's normal home medications, they will be discharged home with prescriptons for,    1. Oxycodone 5mg tablet, take 1 tablet q4 hours prn pain for 7 days, #42, 0 refills.   2. Zanaflex , take 1 tablet q8 hours prn spasm for 30 days, 90, 0 refills.   3. Gabapentin 600mg TID PRN x30 days, #90

## 2023-10-16 ENCOUNTER — HOSPITAL ENCOUNTER (EMERGENCY)
Facility: MEDICAL CENTER | Age: 43
End: 2023-10-17
Attending: STUDENT IN AN ORGANIZED HEALTH CARE EDUCATION/TRAINING PROGRAM | Admitting: HOSPITALIST
Payer: MEDICAID

## 2023-10-16 ENCOUNTER — APPOINTMENT (OUTPATIENT)
Dept: RADIOLOGY | Facility: MEDICAL CENTER | Age: 43
End: 2023-10-16
Attending: STUDENT IN AN ORGANIZED HEALTH CARE EDUCATION/TRAINING PROGRAM
Payer: MEDICAID

## 2023-10-16 DIAGNOSIS — M54.2 NECK PAIN: ICD-10-CM

## 2023-10-16 PROBLEM — Z72.0 TOBACCO ABUSE: Status: ACTIVE | Noted: 2023-10-16

## 2023-10-16 PROBLEM — Z98.890 S/P CERVICAL DISCECTOMY: Status: ACTIVE | Noted: 2023-10-16

## 2023-10-16 LAB
ALBUMIN SERPL BCP-MCNC: 4.3 G/DL (ref 3.2–4.9)
ALBUMIN/GLOB SERPL: 1.4 G/DL
ALP SERPL-CCNC: 75 U/L (ref 30–99)
ALT SERPL-CCNC: 40 U/L (ref 2–50)
ANION GAP SERPL CALC-SCNC: 10 MMOL/L (ref 7–16)
AST SERPL-CCNC: 36 U/L (ref 12–45)
BASOPHILS # BLD AUTO: 0.2 % (ref 0–1.8)
BASOPHILS # BLD: 0.03 K/UL (ref 0–0.12)
BILIRUB SERPL-MCNC: 0.3 MG/DL (ref 0.1–1.5)
BUN SERPL-MCNC: 7 MG/DL (ref 8–22)
CALCIUM ALBUM COR SERPL-MCNC: 9.5 MG/DL (ref 8.5–10.5)
CALCIUM SERPL-MCNC: 9.7 MG/DL (ref 8.5–10.5)
CHLORIDE SERPL-SCNC: 105 MMOL/L (ref 96–112)
CO2 SERPL-SCNC: 25 MMOL/L (ref 20–33)
CREAT SERPL-MCNC: 0.41 MG/DL (ref 0.5–1.4)
CRP SERPL HS-MCNC: 2.21 MG/DL (ref 0–0.75)
EOSINOPHIL # BLD AUTO: 0.26 K/UL (ref 0–0.51)
EOSINOPHIL NFR BLD: 1.8 % (ref 0–6.9)
ERYTHROCYTE [DISTWIDTH] IN BLOOD BY AUTOMATED COUNT: 42.4 FL (ref 35.9–50)
GFR SERPLBLD CREATININE-BSD FMLA CKD-EPI: 125 ML/MIN/1.73 M 2
GLOBULIN SER CALC-MCNC: 3.1 G/DL (ref 1.9–3.5)
GLUCOSE SERPL-MCNC: 123 MG/DL (ref 65–99)
HCG SERPL QL: NEGATIVE
HCT VFR BLD AUTO: 43.8 % (ref 37–47)
HGB BLD-MCNC: 14.3 G/DL (ref 12–16)
IMM GRANULOCYTES # BLD AUTO: 0.16 K/UL (ref 0–0.11)
IMM GRANULOCYTES NFR BLD AUTO: 1.1 % (ref 0–0.9)
INR PPP: 1.04 (ref 0.87–1.13)
LYMPHOCYTES # BLD AUTO: 2.07 K/UL (ref 1–4.8)
LYMPHOCYTES NFR BLD: 14.5 % (ref 22–41)
MCH RBC QN AUTO: 27.8 PG (ref 27–33)
MCHC RBC AUTO-ENTMCNC: 32.6 G/DL (ref 32.2–35.5)
MCV RBC AUTO: 85.2 FL (ref 81.4–97.8)
MONOCYTES # BLD AUTO: 0.55 K/UL (ref 0–0.85)
MONOCYTES NFR BLD AUTO: 3.8 % (ref 0–13.4)
NEUTROPHILS # BLD AUTO: 11.22 K/UL (ref 1.82–7.42)
NEUTROPHILS NFR BLD: 78.6 % (ref 44–72)
NRBC # BLD AUTO: 0 K/UL
NRBC BLD-RTO: 0 /100 WBC (ref 0–0.2)
PLATELET # BLD AUTO: 322 K/UL (ref 164–446)
PMV BLD AUTO: 9.2 FL (ref 9–12.9)
POTASSIUM SERPL-SCNC: 4.3 MMOL/L (ref 3.6–5.5)
PROT SERPL-MCNC: 7.4 G/DL (ref 6–8.2)
PROTHROMBIN TIME: 13.7 SEC (ref 12–14.6)
RBC # BLD AUTO: 5.14 M/UL (ref 4.2–5.4)
SODIUM SERPL-SCNC: 140 MMOL/L (ref 135–145)
WBC # BLD AUTO: 14.3 K/UL (ref 4.8–10.8)

## 2023-10-16 PROCEDURE — 84703 CHORIONIC GONADOTROPIN ASSAY: CPT

## 2023-10-16 PROCEDURE — 99406 BEHAV CHNG SMOKING 3-10 MIN: CPT

## 2023-10-16 PROCEDURE — 85610 PROTHROMBIN TIME: CPT

## 2023-10-16 PROCEDURE — 99406 BEHAV CHNG SMOKING 3-10 MIN: CPT | Performed by: HOSPITALIST

## 2023-10-16 PROCEDURE — 36415 COLL VENOUS BLD VENIPUNCTURE: CPT

## 2023-10-16 PROCEDURE — 96374 THER/PROPH/DIAG INJ IV PUSH: CPT

## 2023-10-16 PROCEDURE — 80053 COMPREHEN METABOLIC PANEL: CPT

## 2023-10-16 PROCEDURE — 700111 HCHG RX REV CODE 636 W/ 250 OVERRIDE (IP): Mod: JZ | Performed by: STUDENT IN AN ORGANIZED HEALTH CARE EDUCATION/TRAINING PROGRAM

## 2023-10-16 PROCEDURE — 700117 HCHG RX CONTRAST REV CODE 255: Mod: UD | Performed by: STUDENT IN AN ORGANIZED HEALTH CARE EDUCATION/TRAINING PROGRAM

## 2023-10-16 PROCEDURE — 99284 EMERGENCY DEPT VISIT MOD MDM: CPT

## 2023-10-16 PROCEDURE — 70491 CT SOFT TISSUE NECK W/DYE: CPT

## 2023-10-16 PROCEDURE — A9270 NON-COVERED ITEM OR SERVICE: HCPCS | Performed by: HOSPITALIST

## 2023-10-16 PROCEDURE — 700102 HCHG RX REV CODE 250 W/ 637 OVERRIDE(OP): Performed by: HOSPITALIST

## 2023-10-16 PROCEDURE — 85025 COMPLETE CBC W/AUTO DIFF WBC: CPT

## 2023-10-16 PROCEDURE — 86140 C-REACTIVE PROTEIN: CPT

## 2023-10-16 PROCEDURE — 99222 1ST HOSP IP/OBS MODERATE 55: CPT | Performed by: HOSPITALIST

## 2023-10-16 RX ORDER — ACETAMINOPHEN 325 MG/1
650 TABLET ORAL EVERY 6 HOURS PRN
Status: DISCONTINUED | OUTPATIENT
Start: 2023-10-16 | End: 2023-10-17 | Stop reason: HOSPADM

## 2023-10-16 RX ORDER — HYDROMORPHONE HYDROCHLORIDE 1 MG/ML
0.5 INJECTION, SOLUTION INTRAMUSCULAR; INTRAVENOUS; SUBCUTANEOUS
Status: DISCONTINUED | OUTPATIENT
Start: 2023-10-16 | End: 2023-10-17 | Stop reason: HOSPADM

## 2023-10-16 RX ORDER — NICOTINE 21 MG/24HR
14 PATCH, TRANSDERMAL 24 HOURS TRANSDERMAL
Status: DISCONTINUED | OUTPATIENT
Start: 2023-10-17 | End: 2023-10-17 | Stop reason: HOSPADM

## 2023-10-16 RX ORDER — SERTRALINE HYDROCHLORIDE 100 MG/1
100 TABLET, FILM COATED ORAL DAILY
Status: DISCONTINUED | OUTPATIENT
Start: 2023-10-17 | End: 2023-10-17 | Stop reason: HOSPADM

## 2023-10-16 RX ORDER — DOXYCYCLINE HYCLATE 100 MG
TABLET ORAL
Status: SHIPPED | COMMUNITY
Start: 2023-10-16 | End: 2024-03-24

## 2023-10-16 RX ORDER — TIZANIDINE 4 MG/1
2 TABLET ORAL 3 TIMES DAILY PRN
Status: DISCONTINUED | OUTPATIENT
Start: 2023-10-16 | End: 2023-10-17 | Stop reason: HOSPADM

## 2023-10-16 RX ORDER — NICOTINE 21 MG/24HR
1 PATCH, TRANSDERMAL 24 HOURS TRANSDERMAL ONCE
Status: DISCONTINUED | OUTPATIENT
Start: 2023-10-16 | End: 2023-10-16

## 2023-10-16 RX ORDER — METHYLPREDNISOLONE 4 MG/1
4-24 TABLET ORAL DAILY
Status: SHIPPED | COMMUNITY
End: 2024-03-24

## 2023-10-16 RX ORDER — DIAZEPAM 5 MG/ML
5 INJECTION, SOLUTION INTRAMUSCULAR; INTRAVENOUS ONCE
Status: COMPLETED | OUTPATIENT
Start: 2023-10-16 | End: 2023-10-16

## 2023-10-16 RX ORDER — DIAZEPAM 5 MG/1
5 TABLET ORAL ONCE
Status: DISCONTINUED | OUTPATIENT
Start: 2023-10-16 | End: 2023-10-16

## 2023-10-16 RX ORDER — OXYCODONE HYDROCHLORIDE 5 MG/1
5 TABLET ORAL
Status: DISCONTINUED | OUTPATIENT
Start: 2023-10-16 | End: 2023-10-17 | Stop reason: HOSPADM

## 2023-10-16 RX ORDER — GABAPENTIN 300 MG/1
600 CAPSULE ORAL EVERY 8 HOURS
Status: DISCONTINUED | OUTPATIENT
Start: 2023-10-16 | End: 2023-10-17 | Stop reason: HOSPADM

## 2023-10-16 RX ORDER — OXYCODONE HYDROCHLORIDE 5 MG/1
10 TABLET ORAL
Status: DISCONTINUED | OUTPATIENT
Start: 2023-10-16 | End: 2023-10-17 | Stop reason: HOSPADM

## 2023-10-16 RX ADMIN — IOHEXOL 80 ML: 350 INJECTION, SOLUTION INTRAVENOUS at 21:47

## 2023-10-16 RX ADMIN — GABAPENTIN 600 MG: 300 CAPSULE ORAL at 23:46

## 2023-10-16 RX ADMIN — NICOTINE 14 MG: 14 PATCH TRANSDERMAL at 23:47

## 2023-10-16 RX ADMIN — DIAZEPAM 5 MG: 5 INJECTION, SOLUTION INTRAMUSCULAR; INTRAVENOUS at 23:17

## 2023-10-16 ASSESSMENT — ENCOUNTER SYMPTOMS
NAUSEA: 0
SINUS PAIN: 0
DIAPHORESIS: 0
FLANK PAIN: 0
PHOTOPHOBIA: 0
BACK PAIN: 0
BRUISES/BLEEDS EASILY: 0
DOUBLE VISION: 0
DEPRESSION: 0
FEVER: 0
POLYDIPSIA: 0
DIARRHEA: 0
FALLS: 0
CONSTIPATION: 0
TREMORS: 0
NECK PAIN: 1
PALPITATIONS: 0
SORE THROAT: 1
BLURRED VISION: 0
WHEEZING: 0
CLAUDICATION: 0
DIZZINESS: 0
CHILLS: 0
TINGLING: 0
MYALGIAS: 0
HEARTBURN: 0
EYE PAIN: 0
VOMITING: 0
SHORTNESS OF BREATH: 1
STRIDOR: 0
BLOOD IN STOOL: 0
ORTHOPNEA: 0
HEADACHES: 0
HALLUCINATIONS: 0
WEAKNESS: 0
ABDOMINAL PAIN: 0
PND: 0
HEMOPTYSIS: 0
COUGH: 0
SPUTUM PRODUCTION: 0

## 2023-10-16 ASSESSMENT — FIBROSIS 4 INDEX: FIB4 SCORE: 0.64

## 2023-10-16 ASSESSMENT — LIFESTYLE VARIABLES: SUBSTANCE_ABUSE: 0

## 2023-10-16 NOTE — ED TRIAGE NOTES
"Chief Complaint   Patient presents with    Post-Op Complications     Patient ambulates to triage post op ACDF, reports golf ball sized lump and discharge from incision, has been unable to eat/drink     N/V       /79   Pulse 88   Temp 36.1 °C (96.9 °F) (Temporal)   Resp 16   Ht 1.753 m (5' 9\")   Wt 83.8 kg (184 lb 11.9 oz)   SpO2 96%     Patient educated on ed triage process, instructed to notify staff of any new or worsening symptoms, verbalizes understanding. Patient returned to ed lobby, apologized for wait times.     " Clinic Care Coordination Contact  [facility]/Voicemail       Clinical Data: Care Coordinator Outreach  Outreach attempted x 2.  Left message on patient's voicemail with call back information and requested return call.    Plan: Care Coordinator will send disenrollment letter with care coordinator contact information via [employer]. Care Coordinator will do no further outreaches at this time.      Flores ROBERTS Public Health  Community Health Worker  [place] [place] [facility]  Clinic Care Coordination  [phone]

## 2023-10-17 VITALS
BODY MASS INDEX: 27.36 KG/M2 | HEIGHT: 69 IN | RESPIRATION RATE: 18 BRPM | TEMPERATURE: 96.8 F | SYSTOLIC BLOOD PRESSURE: 107 MMHG | DIASTOLIC BLOOD PRESSURE: 70 MMHG | HEART RATE: 66 BPM | WEIGHT: 184.75 LBS | OXYGEN SATURATION: 93 %

## 2023-10-17 PROBLEM — Z98.890 S/P CERVICAL DISCECTOMY: Status: RESOLVED | Noted: 2023-10-16 | Resolved: 2023-10-17

## 2023-10-17 LAB
ABO GROUP BLD: NORMAL
ALBUMIN SERPL BCP-MCNC: 4.1 G/DL (ref 3.2–4.9)
ALBUMIN/GLOB SERPL: 1.3 G/DL
ALP SERPL-CCNC: 65 U/L (ref 30–99)
ALT SERPL-CCNC: 39 U/L (ref 2–50)
ANION GAP SERPL CALC-SCNC: 10 MMOL/L (ref 7–16)
AST SERPL-CCNC: 29 U/L (ref 12–45)
BASOPHILS # BLD AUTO: 0.2 % (ref 0–1.8)
BASOPHILS # BLD: 0.03 K/UL (ref 0–0.12)
BILIRUB SERPL-MCNC: 0.3 MG/DL (ref 0.1–1.5)
BLD GP AB SCN SERPL QL: NORMAL
BUN SERPL-MCNC: 7 MG/DL (ref 8–22)
CALCIUM ALBUM COR SERPL-MCNC: 9.4 MG/DL (ref 8.5–10.5)
CALCIUM SERPL-MCNC: 9.5 MG/DL (ref 8.5–10.5)
CHLORIDE SERPL-SCNC: 102 MMOL/L (ref 96–112)
CO2 SERPL-SCNC: 26 MMOL/L (ref 20–33)
CREAT SERPL-MCNC: 0.42 MG/DL (ref 0.5–1.4)
CRP SERPL HS-MCNC: 1.77 MG/DL (ref 0–0.75)
EOSINOPHIL # BLD AUTO: 0.05 K/UL (ref 0–0.51)
EOSINOPHIL NFR BLD: 0.4 % (ref 0–6.9)
ERYTHROCYTE [DISTWIDTH] IN BLOOD BY AUTOMATED COUNT: 42.7 FL (ref 35.9–50)
ERYTHROCYTE [SEDIMENTATION RATE] IN BLOOD BY WESTERGREN METHOD: 11 MM/HOUR (ref 0–25)
GFR SERPLBLD CREATININE-BSD FMLA CKD-EPI: 124 ML/MIN/1.73 M 2
GLOBULIN SER CALC-MCNC: 3.2 G/DL (ref 1.9–3.5)
GLUCOSE SERPL-MCNC: 119 MG/DL (ref 65–99)
HCT VFR BLD AUTO: 43.3 % (ref 37–47)
HGB BLD-MCNC: 14.4 G/DL (ref 12–16)
IMM GRANULOCYTES # BLD AUTO: 0.07 K/UL (ref 0–0.11)
IMM GRANULOCYTES NFR BLD AUTO: 0.5 % (ref 0–0.9)
LYMPHOCYTES # BLD AUTO: 2.05 K/UL (ref 1–4.8)
LYMPHOCYTES NFR BLD: 15.6 % (ref 22–41)
MCH RBC QN AUTO: 28.3 PG (ref 27–33)
MCHC RBC AUTO-ENTMCNC: 33.3 G/DL (ref 32.2–35.5)
MCV RBC AUTO: 85.2 FL (ref 81.4–97.8)
MONOCYTES # BLD AUTO: 0.45 K/UL (ref 0–0.85)
MONOCYTES NFR BLD AUTO: 3.4 % (ref 0–13.4)
NEUTROPHILS # BLD AUTO: 10.45 K/UL (ref 1.82–7.42)
NEUTROPHILS NFR BLD: 79.9 % (ref 44–72)
NRBC # BLD AUTO: 0 K/UL
NRBC BLD-RTO: 0 /100 WBC (ref 0–0.2)
PLATELET # BLD AUTO: 312 K/UL (ref 164–446)
PMV BLD AUTO: 9.4 FL (ref 9–12.9)
POTASSIUM SERPL-SCNC: 4.2 MMOL/L (ref 3.6–5.5)
PROCALCITONIN SERPL-MCNC: <0.05 NG/ML
PROT SERPL-MCNC: 7.3 G/DL (ref 6–8.2)
RBC # BLD AUTO: 5.08 M/UL (ref 4.2–5.4)
RH BLD: NORMAL
SODIUM SERPL-SCNC: 138 MMOL/L (ref 135–145)
WBC # BLD AUTO: 13.1 K/UL (ref 4.8–10.8)

## 2023-10-17 PROCEDURE — 700102 HCHG RX REV CODE 250 W/ 637 OVERRIDE(OP): Performed by: HOSPITALIST

## 2023-10-17 PROCEDURE — 85652 RBC SED RATE AUTOMATED: CPT

## 2023-10-17 PROCEDURE — 96375 TX/PRO/DX INJ NEW DRUG ADDON: CPT

## 2023-10-17 PROCEDURE — 86850 RBC ANTIBODY SCREEN: CPT

## 2023-10-17 PROCEDURE — 700111 HCHG RX REV CODE 636 W/ 250 OVERRIDE (IP): Performed by: HOSPITALIST

## 2023-10-17 PROCEDURE — 85025 COMPLETE CBC W/AUTO DIFF WBC: CPT

## 2023-10-17 PROCEDURE — A9270 NON-COVERED ITEM OR SERVICE: HCPCS | Performed by: HOSPITALIST

## 2023-10-17 PROCEDURE — 700111 HCHG RX REV CODE 636 W/ 250 OVERRIDE (IP)

## 2023-10-17 PROCEDURE — 86900 BLOOD TYPING SEROLOGIC ABO: CPT

## 2023-10-17 PROCEDURE — 99239 HOSP IP/OBS DSCHRG MGMT >30: CPT | Performed by: HOSPITALIST

## 2023-10-17 PROCEDURE — 84145 PROCALCITONIN (PCT): CPT

## 2023-10-17 PROCEDURE — 86901 BLOOD TYPING SEROLOGIC RH(D): CPT

## 2023-10-17 PROCEDURE — 36415 COLL VENOUS BLD VENIPUNCTURE: CPT

## 2023-10-17 PROCEDURE — 86140 C-REACTIVE PROTEIN: CPT

## 2023-10-17 PROCEDURE — 700111 HCHG RX REV CODE 636 W/ 250 OVERRIDE (IP): Performed by: PHYSICIAN ASSISTANT

## 2023-10-17 PROCEDURE — 96376 TX/PRO/DX INJ SAME DRUG ADON: CPT

## 2023-10-17 PROCEDURE — 80053 COMPREHEN METABOLIC PANEL: CPT

## 2023-10-17 RX ORDER — DEXAMETHASONE SODIUM PHOSPHATE 4 MG/ML
6 INJECTION, SOLUTION INTRA-ARTICULAR; INTRALESIONAL; INTRAMUSCULAR; INTRAVENOUS; SOFT TISSUE ONCE
Status: COMPLETED | OUTPATIENT
Start: 2023-10-17 | End: 2023-10-17

## 2023-10-17 RX ORDER — HYDROMORPHONE HYDROCHLORIDE 1 MG/ML
0.5 INJECTION, SOLUTION INTRAMUSCULAR; INTRAVENOUS; SUBCUTANEOUS ONCE
Status: COMPLETED | OUTPATIENT
Start: 2023-10-17 | End: 2023-10-17

## 2023-10-17 RX ADMIN — HYDROMORPHONE HYDROCHLORIDE 0.5 MG: 1 INJECTION, SOLUTION INTRAMUSCULAR; INTRAVENOUS; SUBCUTANEOUS at 04:22

## 2023-10-17 RX ADMIN — GABAPENTIN 600 MG: 300 CAPSULE ORAL at 05:37

## 2023-10-17 RX ADMIN — OXYCODONE HYDROCHLORIDE 10 MG: 5 TABLET ORAL at 05:45

## 2023-10-17 RX ADMIN — HYDROMORPHONE HYDROCHLORIDE 0.5 MG: 1 INJECTION, SOLUTION INTRAMUSCULAR; INTRAVENOUS; SUBCUTANEOUS at 02:08

## 2023-10-17 RX ADMIN — HYDROMORPHONE HYDROCHLORIDE 0.5 MG: 1 INJECTION, SOLUTION INTRAMUSCULAR; INTRAVENOUS; SUBCUTANEOUS at 08:45

## 2023-10-17 RX ADMIN — DEXAMETHASONE SODIUM PHOSPHATE 6 MG: 4 INJECTION INTRA-ARTICULAR; INTRALESIONAL; INTRAMUSCULAR; INTRAVENOUS; SOFT TISSUE at 12:36

## 2023-10-17 ASSESSMENT — PAIN DESCRIPTION - PAIN TYPE: TYPE: ACUTE PAIN

## 2023-10-17 NOTE — H&P
Hospital Medicine History & Physical Note    Date of Service  10/16/2023    Primary Care Physician  JJ Charles.    Consultants  neurosurgery    Specialist Names: Dr. Wu    Code Status  Full Code    Chief Complaint  Chief Complaint   Patient presents with    Post-Op Complications     Patient ambulates to triage post op ACDF, reports golf ball sized lump and discharge from incision, has been unable to eat/drink     N/V       History of Presenting Illness  Edith Glass is a 43 y.o. female who presented 10/16/2023 with without any significant past medical history who who is status post C5-C6 ACDF last completed on 10/10.  On 10/13 she started noticing swelling from her surgical wound on anterior part of her neck.  Started having some pustular drainage from the area.  She is having some difficulty breathing and sore throat.  The patient also complains of neck pain is radiating down the right arm.  She also complains of sensory changes on the right arm.  She denies any fever, chills, nausea, vomiting, diarrhea.    CT scan of the neck found prevertebral fluid tracking from C2-C6 containing foci of air.    I discussed the plan of care with patient.    Review of Systems  Review of Systems   Constitutional:  Negative for chills, diaphoresis, fever and malaise/fatigue.   HENT:  Positive for sore throat. Negative for congestion, ear discharge, ear pain, hearing loss, nosebleeds, sinus pain and tinnitus.    Eyes:  Negative for blurred vision, double vision, photophobia and pain.   Respiratory:  Positive for shortness of breath. Negative for cough, hemoptysis, sputum production, wheezing and stridor.    Cardiovascular:  Negative for chest pain, palpitations, orthopnea, claudication, leg swelling and PND.   Gastrointestinal:  Negative for abdominal pain, blood in stool, constipation, diarrhea, heartburn, melena, nausea and vomiting.   Genitourinary:  Negative for dysuria, flank pain, frequency, hematuria and  urgency.   Musculoskeletal:  Positive for neck pain. Negative for back pain, falls, joint pain and myalgias.   Skin:  Negative for itching and rash.   Neurological:  Negative for dizziness, tingling, tremors, weakness and headaches.   Endo/Heme/Allergies:  Negative for environmental allergies and polydipsia. Does not bruise/bleed easily.   Psychiatric/Behavioral:  Negative for depression, hallucinations, substance abuse and suicidal ideas.        Past Medical History   has a past medical history of Adverse effect of anesthesia, Heart burn, Indigestion, Psychiatric disorder (depression), and Sleep apnea.    Surgical History   has a past surgical history that includes other orthopedic surgery (2005); hysterectomy laparoscopy; cholecystectomy; and cervical disk and fusion anterior (N/A, 10/10/2023).     Family History  family history includes Heart Disease in her mother.   Family history reviewed with patient. There is no family history that is pertinent to the chief complaint.     Social History   reports that she has been smoking cigarettes. She has a 15.0 pack-year smoking history. She has never used smokeless tobacco. She reports that she does not drink alcohol and does not use drugs.    Allergies  Allergies   Allergen Reactions    Nkda [No Known Drug Allergy]        Medications  Prior to Admission Medications   Prescriptions Last Dose Informant Patient Reported? Taking?   AJOVY 225 MG/1.5ML Solution Auto-injector 3 weeks ago at [facility] Patient Yes No   Sig: INJECT 1 PEN SUBCUTANEOUSLY ONCE MONTHLY.   VALERIAN ROOT PO 10/15/2023 at PM Patient Yes No   Sig: Take  by mouth every day.   acetaminophen (TYLENOL) 500 MG Tab PRN at PRN Patient Yes No   Sig: Take 1,000 mg by mouth every 6 hours as needed.   albuterol 108 (90 BASE) MCG/ACT Aero Soln inhalation aerosol PRN at PRN Patient No No   Sig: Inhale 2 Puffs by mouth every 6 hours as needed for Shortness of Breath.   doxycycline (VIBRAMYCIN) 100 MG Tab 10/16/2023 at [facility]  Patient Yes Yes   Sig: Take 1 tablet twice a day by oral route for 10 days.   gabapentin (NEURONTIN) 600 MG tablet UNK at K Patient No No   Sig: Take 1 Tablet by mouth 3 times a day for 30 days.   methylPREDNISolone (MEDROL DOSEPAK) 4 MG Tablet Therapy Pack 10/16/2023 at [facility] Patient Yes Yes   Sig: Take 4-24 mg by mouth every day. Follow schedule on package instructions.   oxyCODONE immediate-release (ROXICODONE) 5 MG Tab UNK at K Patient No No   Sig: Take 1 Tablet by mouth every 3 hours as needed (Mild pain) for up to 7 days.   rizatriptan (MAXALT-MLT) 10 MG disintegrating tablet PRN at PRN Patient Yes No   Sig: TAKE 1 TABLET BY MOUTH AT ONSET OF HEADACHE. OKAY TO REPEAT DOSE IN 2 HOURS IF NEEDED. MAX OF 2 TABLETS IN 24 HOURS.   sertraline (ZOLOFT) 100 MG Tab 10/16/2023 at [facility] Patient Yes No   Sig: Take 100 mg by mouth every day. TAKE W/50MG   sertraline (ZOLOFT) 50 MG Tab 10/16/2023 at [facility] Patient Yes No   Sig: Take 50 mg by mouth every day. TAKE W/100MG   tizanidine (ZANAFLEX) 2 MG tablet PRN at PRN Patient No No   Sig: Take 1 Tablet by mouth 3 times a day as needed (neck pain).      Facility-Administered Medications: None       Physical Exam  Temp:  [36.1 °C (96.9 °F)] 36.1 °C (96.9 °F)  Pulse:  [64-88] 66  Resp:  [16-18] 18  BP: ()/(55-79) 96/55  SpO2:  [90 %-96 %] 92 %  Blood Pressure: 96/55   Temperature: 36.1 °C (96.9 °F)   Pulse: 66   Respiration: 18   Pulse Oximetry: 92 %       Physical Exam  Vitals and nursing note reviewed.   Constitutional:       General: She is not in acute distress.     Appearance: Normal appearance. She is not ill-appearing, toxic-appearing or diaphoretic.   HENT:      Head: Normocephalic and atraumatic.      Nose: No congestion or rhinorrhea.      Mouth/Throat:      Pharynx: No oropharyngeal exudate or posterior oropharyngeal erythema.   Eyes:      General: No scleral icterus.  Neck:      Vascular: No carotid bruit or JVD.      Comments: Neck swelling with  "drainage  Cardiovascular:      Rate and Rhythm: Normal rate and regular rhythm.      Pulses: Normal pulses.      Heart sounds: Normal heart sounds. No murmur heard.     No friction rub. No gallop.   Pulmonary:      Effort: Pulmonary effort is normal. No respiratory distress.      Breath sounds: No stridor. No wheezing, rhonchi or rales.   Abdominal:      General: Abdomen is flat. There is no distension.      Palpations: There is no mass.      Tenderness: There is no abdominal tenderness. There is no left CVA tenderness, guarding or rebound.      Hernia: No hernia is present.   Musculoskeletal:         General: No swelling. Normal range of motion.      Cervical back: No rigidity. No muscular tenderness.      Right lower leg: No edema.      Left lower leg: No edema.   Lymphadenopathy:      Cervical: No cervical adenopathy.   Skin:     General: Skin is warm and dry.      Capillary Refill: Capillary refill takes more than 3 seconds.      Coloration: Skin is not jaundiced or pale.      Findings: No bruising or erythema.   Neurological:      Mental Status: She is alert.         Laboratory:  Recent Labs     10/16/23  2010   WBC 14.3*   RBC 5.14   HEMOGLOBIN 14.3   HEMATOCRIT 43.8   MCV 85.2   MCH 27.8   MCHC 32.6   RDW 42.4   PLATELETCT 322   MPV 9.2     Recent Labs     10/16/23  2010   SODIUM 140   POTASSIUM 4.3   CHLORIDE 105   CO2 25   GLUCOSE 123*   BUN 7*   CREATININE 0.41*   CALCIUM 9.7     Recent Labs     10/16/23  2010   ALTSGPT 40   ASTSGOT 36   ALKPHOSPHAT 75   TBILIRUBIN 0.3   GLUCOSE 123*     Recent Labs     10/16/23  2010   INR 1.04     No results for input(s): \"NTPROBNP\" in the last 72 hours.      No results for input(s): \"TROPONINT\" in the last 72 hours.    Imaging:  CT-SOFT TISSUE NECK WITH   Final Result         1.  Prevertebral fluid tracking from C2 through C6 and within the right neck containing foci of air. Given recent surgical history findings are nonspecific and could represent postop seroma and/or " hematoma. Early evolving abscess is not radiographically    excluded however there is no enhancement identified at this time to indicate abscess.          no X-Ray or EKG requiring interpretation    Assessment/Plan:  Justification for Admission Status  I anticipate this patient will require at least two midnights for appropriate medical management, necessitating inpatient admission because neck swelling after surgery    Patient will need a Med/Surg bed on NEUROSURGERY service .  The need is secondary to neck swelling after surgery.    * S/P cervical discectomy- (present on admission)  Assessment & Plan  Patient now presents with a swelling of her neck from her surgical wound with pustular drainage  Obtain ESR and CRP  Pain control  Neurosurgery was contacted and recommended not to start antibiotics  N.p.o. after midnight for I&D    Tobacco abuse  Assessment & Plan  Tobacco cessation education provided for more than 5 minutes.  We discussed the risks of smoking including increased risk of heart disease, stroke, cancer and COPD. We discussed the benefits of quitting smoking. We discussed options of nicotine patch, wellbutrin and chantix.  The patient has the intention to quit smoking. Nicotine replacement protocol will be provided to the patient.          VTE prophylaxis: SCDs/TEDs

## 2023-10-17 NOTE — ASSESSMENT & PLAN NOTE
Patient now presents with a swelling of her neck from her surgical wound with pustular drainage  Obtain ESR and CRP  Pain control  Neurosurgery was contacted and recommended not to start antibiotics  N.p.o. after midnight for I&D

## 2023-10-17 NOTE — ED NOTES
Med Rec complete per Pt at bedside. Pt reports taking 1 dose of Doxycycline then vomited.   Allergies reviewed  Home Pharmacy:  [facility]    Pt is not taking any anticoagulants.

## 2023-10-17 NOTE — ED PROVIDER NOTES
ED Provider Note    CHIEF COMPLAINT  Chief Complaint   Patient presents with    Post-Op Complications     Patient ambulates to triage post op ACDF, reports golf ball sized lump and discharge from incision, has been unable to eat/drink     N/V       EXTERNAL RECORDS REVIEWED  Inpatient Notes ACDF on 10/10    HPI/ROS  LIMITATION TO HISTORY     OUTSIDE HISTORIAN(S):      Edith Glass is a 43 y.o. female who presents with neck swelling and pain.  Patient says since being discharged from the hospital after C5-C6 surgery she has had right-sided neck swelling.  Patient says over the last 2 days swelling and pain is gotten worse.  Patient says that she noticed a light yellow discharge from the incision site.  Patient denies difficulty breathing, difficulty swallowing, chest pain, abdominal pain.  Patient denies fever, chills.    PAST MEDICAL HISTORY   has a past medical history of Adverse effect of anesthesia, Heart burn, Indigestion, Psychiatric disorder (depression), and Sleep apnea.    SURGICAL HISTORY   has a past surgical history that includes other orthopedic surgery (2005); hysterectomy laparoscopy; cholecystectomy; and cervical disk and fusion anterior (N/A, 10/10/2023).    FAMILY HISTORY  Family History   Problem Relation Age of Onset    Heart Disease Mother        SOCIAL HISTORY  Social History     Tobacco Use    Smoking status: Every Day     Current packs/day: 0.50     Average packs/day: 0.5 packs/day for 30.0 years (15.0 ttl pk-yrs)     Types: Cigarettes    Smokeless tobacco: Never   Vaping Use    Vaping Use: Never used   Substance and Sexual Activity    Alcohol use: No    Drug use: No    Sexual activity: Yes       CURRENT MEDICATIONS  Home Medications       Reviewed by Hayley Lobo (Pharmacy Tech) on 10/16/23 at 2311  Med List Status: Complete     Medication Last Dose Status   acetaminophen (TYLENOL) 500 MG Tab PRN Active   AJOVY 225 MG/1.5ML Solution Auto-injector 3 weeks ago Active  "  albuterol 108 (90 BASE) MCG/ACT Aero Soln inhalation aerosol PRN Active   doxycycline (VIBRAMYCIN) 100 MG Tab 10/16/2023 Active   gabapentin (NEURONTIN) 600 MG tablet UNK Active   methylPREDNISolone (MEDROL DOSEPAK) 4 MG Tablet Therapy Pack 10/16/2023 Active   oxyCODONE immediate-release (ROXICODONE) 5 MG Tab UNK Active   rizatriptan (MAXALT-MLT) 10 MG disintegrating tablet PRN Active   sertraline (ZOLOFT) 100 MG Tab 10/16/2023 Active   sertraline (ZOLOFT) 50 MG Tab 10/16/2023 Active   tizanidine (ZANAFLEX) 2 MG tablet PRN Active   VALERIAN ROOT PO 10/15/2023 Active                    ALLERGIES  Allergies   Allergen Reactions    Nkda [No Known Drug Allergy]        PHYSICAL EXAM  VITAL SIGNS: /69   Pulse 70   Temp 36.1 °C (96.9 °F) (Temporal)   Resp 18   Ht 1.753 m (5' 9\")   Wt 83.8 kg (184 lb 11.9 oz)   LMP 11/05/2009   SpO2 92%   BMI 27.28 kg/m²    No acute distress, pupils equal round reactive, clear nasopharynx oropharynx, no stridor, tolerating secretions, normal voice, right anterior neck swelling with tenderness to incision site mild erythema, clear bilateral breath sounds, normal heart sounds, abdomen soft nontender nondistended    DIAGNOSTIC STUDIES / PROCEDURES  EKG    LABS  Labs Reviewed   CBC WITH DIFFERENTIAL - Abnormal; Notable for the following components:       Result Value    WBC 14.3 (*)     Neutrophils-Polys 78.60 (*)     Lymphocytes 14.50 (*)     Immature Granulocytes 1.10 (*)     Neutrophils (Absolute) 11.22 (*)     Immature Granulocytes (abs) 0.16 (*)     All other components within normal limits   COMP METABOLIC PANEL - Abnormal; Notable for the following components:    Glucose 123 (*)     Bun 7 (*)     Creatinine 0.41 (*)     All other components within normal limits   CRP QUANTITIVE (NON-CARDIAC) - Abnormal; Notable for the following components:    Stat C-Reactive Protein 2.21 (*)     All other components within normal limits   HCG QUAL SERUM   ESTIMATED GFR   PROTHROMBIN TIME "    Narrative:     Indicate which anticoagulants the patient is on:->UNKNOWN   COD (ADULT)   PROCALCITONIN   CBC WITH DIFFERENTIAL   COMP METABOLIC PANEL   SED RATE   CRP QUANTITIVE (NON-CARDIAC)         RADIOLOGY    Radiologist interpretation:   CT-SOFT TISSUE NECK WITH   Final Result         1.  Prevertebral fluid tracking from C2 through C6 and within the right neck containing foci of air. Given recent surgical history findings are nonspecific and could represent postop seroma and/or hematoma. Early evolving abscess is not radiographically    excluded however there is no enhancement identified at this time to indicate abscess.            COURSE & MEDICAL DECISION MAKING    ED Observation Status?     INITIAL ASSESSMENT, COURSE AND PLAN  Care Narrative: Differential includes hematoma, abscess, cellulitis.  Patient without signs of airway obstruction or sepsis.  Will obtain CT to assess for abscess or bleeding.  Will obtain blood work to assess for metabolic or hematologic derangements.    CT imaging with fluid collection concerning for seroma versus hematoma possibly abscess.  Spoke with Dr. Wu from spine surgery who recommended overnight admission likely washout tomorrow, does not recommend IV antibiotics.  Patient had reservations about admission discussed at length with patient CT findings and recommendations from spine surgery.  Patient willing to be admitted.  Spoke with hospitalist regarding admission, patient made n.p.o. after midnight and added on CRP at request of spine surgery.        ADDITIONAL PROBLEM LIST    DISPOSITION AND DISCUSSIONS  I have discussed management of the patient with the following physicians and SERG's: Spine surgery, hospitalist      FINAL DIAGNOSIS  1. Neck pain           Electronically signed by: Fitz Escoto D.O., 10/16/2023 11:51 PM

## 2023-10-17 NOTE — DISCHARGE INSTRUCTIONS
FOLLOW UP WITH PCP IN 1 WEEK        Soft-Food Eating Plan  A soft-food eating plan includes foods that are safe and easy to chew and swallow. Your health care provider or dietitian can help you find foods and flavors that fit into this plan. Follow this plan until your health care provider or dietitian says it is safe to start eating other foods and food textures.  What are tips for following this plan?  Cooking  Cook meats so they stay tender and moist. Use methods like braising, stewing, or baking in liquid.  Cook vegetables and fruit until they are soft enough to be mashed with a fork.  Peel soft, fresh fruits such as peaches, nectarines, and melons.  When making soup, make sure chunks of meat and vegetables are smaller than ½ inch.  Reheat leftover foods slowly so that a tough crust does not form.  General information    Take small bites of food or cut food into pieces about ½ inch or smaller. Bite-sized pieces of food are easier to chew and swallow.  Eat moist foods. Avoid overly dry foods.  Avoid foods that:  Are difficult to swallow, such as dry, chunky, crispy, or sticky foods.  Are difficult to chew, such as hard, tough, or stringy foods.  Contain nuts, seeds, or many types of uncooked fruit.  Follow instructions from your dietitian about the types of liquids that are safe for you to swallow. You may be allowed to have:  Thick liquids only. This includes only liquids that are thicker than honey.  Thin and thick liquids. This includes all beverages and foods that become liquid at room temperature.  To make thick liquids:  Purchase a commercial liquid thickening powder. These are available at grocery stores and pharmacies.  Mix the thickener into liquids according to instructions on the label.  Purchase ready-made thickened liquids.  Thicken soup by pureeing, straining to remove chunks, and adding flour, potato flakes, or corn starch.  Add commercial thickener to foods that become liquid at room temperature,  such as milk shakes, yogurt, ice cream, gelatin, and sherbet.  Ask your health care provider whether you need to take a fiber supplement.  What foods should I eat?  Fruits  All canned and cooked fruits. Soft, peeled fresh fruits. Strawberries.  Vegetables  All soft-cooked vegetables. Shredded lettuce.  Grains  Breads, muffins, pancakes, or waffles moistened with syrup, jelly, or butter. Dry cereals well-moistened with milk. Moist, cooked cereals. Well-cooked pasta and rice.  Meats and other proteins  Tender, moist ground meat, poultry, or fish. Meat cooked in gravy or sauces. Eggs.  Dairy  Milk. Cream. Yogurt. Cottage cheese. Soft cheese without the rind.  Sweets and desserts  Ice cream. Milk shakes. Sherbet. Pudding.  Fats and oils  Butter. Margarine. Olive, canola, sunflower, and grapeseed oil. Smooth salad dressing. Smooth cream cheese. Mayonnaise. Gravy.  The items listed above may not be a complete list of foods and beverages you can eat. Contact a dietitian for more information.  What foods should I avoid?  Fruits  Fresh fruits with skins or seeds, or both, such as apples, pears, and grapes. Stringy, high-pulp fruits, such as papaya, pineapple, coconut, and sara. Fruit leather and all dried fruit.  Vegetables  All raw vegetables. Cooked corn. Cooked vegetables that are tough or stringy. Tough, crisp, fried potatoes and potato skins.  Grains  Coarse or dry cereals, such as bran, granola, and shredded wheat. Tough or chewy crusty breads, such as [place] bread or baguettes. Breads with nuts, seeds, or fruit.  Meats and other proteins  Hard, dry sausages. Dry meat, poultry, or fish. Meats with gristle. Fish with bones. Fried meat or fish. Lunch meat and hotdogs. Nuts and seeds. [place] peanut butter or other nut butters.  Dairy  Yogurt with nuts or coconut.  Sweets and desserts  Cakes or cookies that are very dry or chewy. Desserts with dried fruit, nuts, or coconut. Fried pastries. Very rich pastries.  Fats and  oils  Cream cheese with fruit or nuts. Salad dressings with seeds or chunks.  The items listed above may not be a complete list of foods and beverages you should avoid. Contact a dietitian for more information.  Summary  A soft-food eating plan includes foods that are safe and easy to swallow. Generally, the foods should be soft enough to be mashed with a fork.  Avoid foods that are dry, hard to chew, crunchy, sticky, stringy, or crispy.  Ask your health care provider whether you need to thicken your liquids and if you need to take a fiber supplement.  This information is not intended to replace advice given to you by your health care provider. Make sure you discuss any questions you have with your health care provider.  Document Revised: 11/19/2021 Document Reviewed: 11/19/2021  Elsevier Patient Education © 2023 [employer]

## 2023-10-17 NOTE — ED NOTES
Pt resting in bed, appears to be sleeping with even rise and fall of chest noted. No obvious signs of pain or distress, reposition self occasionally, bed in low position.

## 2023-10-17 NOTE — DISCHARGE SUMMARY
Discharge Summary    CHIEF COMPLAINT ON ADMISSION  Chief Complaint   Patient presents with    Post-Op Complications     Patient ambulates to triage post op ACDF, reports golf ball sized lump and discharge from incision, has been unable to eat/drink     N/V       Reason for Admission  Post Op Complication     Admission Date  10/16/2023    CODE STATUS  Full Code    HPI & HOSPITAL COURSE  Please see original H&P and consult notes for specific information  Patient was admitted due to possible postop infection patient is a status post C5-C6 ACDF 1 week ago, came to hospital Complaining of neck swelling, patient received oral steroids but could not tolerate it at home due to vomiting, patient received supportive treatment in the emergency room neurosurgery was consulted after evaluation by neurosurgery after reviewing CT patient was cleared for discharge by neurosurgery and recommended to continue on oral antibiotics pain medication already prescribed by neurosurgery, when I saw patient she is alert oriented follows commands she is eager to go home she is tolerating diet she denies any shortness of breath no stridor no wheezing, patient at this time is being able to ambulate she is on room air, no drooling, patient again is eager to go home she will follow-up with primary care physician and neurosurgery as outpatient, which she has expressed understanding of her plan of care and agree with it all question have been answered.    Therefore, she is discharged in good and stable condition to home with close outpatient follow-up.    The patient recovered much more quickly than anticipated on admission.    Discharge Date  10/17/23    FOLLOW UP ITEMS POST DISCHARGE  Primary care physician  Neurosurgery    DISCHARGE DIAGNOSES  Principal Problem (Resolved):    S/P cervical discectomy (POA: Yes)  Active Problems:    Tobacco abuse (POA: Unknown)      FOLLOW UP  No future appointments.  No follow-up provider specified.    MEDICATIONS  ON DISCHARGE     Medication List        CHANGE how you take these medications        Instructions   sertraline 100 MG Tabs  What changed: Another medication with the same name was removed. Continue taking this medication, and follow the directions you see here.  Commonly known as: Zoloft   Take 100 mg by mouth every day. TAKE W/50MG  Dose: 100 mg            CONTINUE taking these medications        Instructions   acetaminophen 500 MG Tabs  Commonly known as: Tylenol   Take 1,000 mg by mouth every 6 hours as needed.  Dose: 1,000 mg     Ajovy 225 MG/1.5ML Soaj  Generic drug: Fremanezumab-vfrm   INJECT 1 PEN SUBCUTANEOUSLY ONCE MONTHLY.     albuterol 108 (90 Base) MCG/ACT Aers inhalation aerosol   Inhale 2 Puffs by mouth every 6 hours as needed for Shortness of Breath.  Dose: 2 Puff     doxycycline 100 MG Tabs  Commonly known as: Vibramycin   Take 1 tablet twice a day by oral route for 10 days.     gabapentin 600 MG tablet  Commonly known as: Neurontin   Take 1 Tablet by mouth 3 times a day for 30 days.  Dose: 600 mg     methylPREDNISolone 4 MG Tbpk  Commonly known as: Medrol Dosepak   Take 4-24 mg by mouth every day. Follow schedule on package instructions.  Dose: 4-24 mg     oxyCODONE immediate-release 5 MG Tabs  Commonly known as: Roxicodone   Take 1 Tablet by mouth every 3 hours as needed (Mild pain) for up to 7 days.  Dose: 5 mg     rizatriptan 10 MG disintegrating tablet  Commonly known as: Maxalt-MLT   TAKE 1 TABLET BY MOUTH AT ONSET OF HEADACHE. OKAY TO REPEAT DOSE IN 2 HOURS IF NEEDED. MAX OF 2 TABLETS IN 24 HOURS.     tizanidine 2 MG tablet  Commonly known as: Zanaflex   Take 1 Tablet by mouth 3 times a day as needed (neck pain).  Dose: 2 mg     VALERIAN ROOT PO   Take  by mouth every day.              Allergies  Allergies   Allergen Reactions    Nkda [No Known Drug Allergy]        DIET  No orders of the defined types were placed in this encounter.      ACTIVITY  As tolerated.  Weight bearing as  tolerated    CONSULTATIONS  Neurosurgery    PROCEDURES  None    LABORATORY  Lab Results   Component Value Date    SODIUM 138 10/17/2023    POTASSIUM 4.2 10/17/2023    CHLORIDE 102 10/17/2023    CO2 26 10/17/2023    GLUCOSE 119 (H) 10/17/2023    BUN 7 (L) 10/17/2023    CREATININE 0.42 (L) 10/17/2023    CREATININE 0.6 09/12/2008        Lab Results   Component Value Date    WBC 13.1 (H) 10/17/2023    HEMOGLOBIN 14.4 10/17/2023    HEMATOCRIT 43.3 10/17/2023    PLATELETCT 312 10/17/2023        Total time of the discharge process exceeds 32 minutes.

## 2023-10-17 NOTE — PROGRESS NOTES
ALL LINES REMOVED, ALL QUESTIONS ANSWERED, DISCHARGE INSTRUCTIONS GIVEN TO PATIENT, PATIENT DISCHARGED

## 2023-10-17 NOTE — PROGRESS NOTES
Neurosurgery Progress Note    Subjective:  Patient with C5/6 ACDF 1 week ago.   Doing well but with dysphagia yesterday and neck swelling.   Tried taking oral steroids at home but vomited.   She notes she is already much improved since coming in last night.   C/o neck pain  No radicular symptoms  No shortness of breath  Tolerating secretions   Neck soft tissue CT shows some fluid and air, trachea midline.         Exam:  Patient calm and cooperative  Incision c/d/i, some slight redness at incision margin  Small soft fluid collection  Watched patient drink water with straw with no difficulty  Voice strong  Motor 5/5  Sensory intact     BP  Min: 96/55  Max: 118/69  Pulse  Av.7  Min: 64  Max: 88  Resp  Av.3  Min: 16  Max: 18  Temp  Av.1 °C (96.9 °F)  Min: 36 °C (96.8 °F)  Max: 36.1 °C (96.9 °F)  SpO2  Av.1 %  Min: 81 %  Max: 96 %    No data recorded    Recent Labs     10/16/23  2010 10/17/23  0305   WBC 14.3* 13.1*   RBC 5.14 5.08   HEMOGLOBIN 14.3 14.4   HEMATOCRIT 43.8 43.3   MCV 85.2 85.2   MCH 27.8 28.3   MCHC 32.6 33.3   RDW 42.4 42.7   PLATELETCT 322 312   MPV 9.2 9.4     Recent Labs     10/16/23  2010 10/17/23  0553   SODIUM 140 138   POTASSIUM 4.3 4.2   CHLORIDE 105 102   CO2 25 26   GLUCOSE 123* 119*   BUN 7* 7*   CREATININE 0.41* 0.42*   CALCIUM 9.7 9.5     Recent Labs     10/16/23  2010   INR 1.04           Intake/Output                         10/16/23 07 - 10/17/23 0659 10/17/23 07 - 10/18/23 0659      Total  Total                 Intake    Total Intake -- -- -- -- -- --       Output    Stool  --  -- --  --  -- --    Number of Times Stooled -- -- -- 0 x -- 0 x    Total Output -- -- -- -- -- --       Net I/O     -- -- -- -- -- --            No intake or output data in the 24 hours ending 10/17/23 1213          acetaminophen  650 mg Q6HRS PRN    Pharmacy Consult Request  1 Each PHARMACY TO DOSE    oxyCODONE immediate-release  5 mg Q3HRS PRN    Or     oxyCODONE immediate-release  10 mg Q3HRS PRN    Or    HYDROmorphone  0.5 mg Q3HRS PRN    gabapentin  600 mg Q8HRS    sertraline  100 mg DAILY    tizanidine  2 mg TID PRN    nicotine  14 mg Daily-0600    And    nicotine polacrilex  2 mg Q HOUR PRN       Assessment and Plan:  Hospital day #2  POD #7  No surgery needed  Okay to d/c  Decadron IV x  now  Went over diet and swallowing exercises with patient  She is to keep her diet soft and slowly advance  Ice neck often  She has doxycyline for home and tapering steroids  She has f/u in 1 week  All questions answered     Prophylactic anticoagulation: no         Start date/time:

## 2023-10-17 NOTE — ED NOTES
Pt resting in bed, with even rise and fall of chest noted. No obvious signs of pain or distress, reposition self occasionally, bed in low position. Updated of POC. Family at bedside.

## 2023-11-16 ENCOUNTER — HOSPITAL ENCOUNTER (OUTPATIENT)
Dept: RADIOLOGY | Facility: MEDICAL CENTER | Age: 43
End: 2023-11-16
Attending: NURSE PRACTITIONER
Payer: MEDICAID

## 2023-11-16 DIAGNOSIS — R91.1 SOLITARY PULMONARY NODULE: ICD-10-CM

## 2023-11-16 PROCEDURE — 700117 HCHG RX CONTRAST REV CODE 255: Mod: UD | Performed by: NURSE PRACTITIONER

## 2023-11-16 PROCEDURE — 71260 CT THORAX DX C+: CPT

## 2023-11-16 RX ADMIN — IOHEXOL 75 ML: 350 INJECTION, SOLUTION INTRAVENOUS at 14:53

## 2023-12-18 ENCOUNTER — HOSPITAL ENCOUNTER (OUTPATIENT)
Dept: RADIOLOGY | Facility: MEDICAL CENTER | Age: 43
End: 2023-12-18
Payer: MEDICAID

## 2024-03-24 ENCOUNTER — OFFICE VISIT (OUTPATIENT)
Dept: URGENT CARE | Facility: PHYSICIAN GROUP | Age: 44
End: 2024-03-24
Payer: MEDICAID

## 2024-03-24 VITALS
BODY MASS INDEX: 28.88 KG/M2 | HEIGHT: 69 IN | RESPIRATION RATE: 14 BRPM | WEIGHT: 195 LBS | DIASTOLIC BLOOD PRESSURE: 88 MMHG | OXYGEN SATURATION: 98 % | TEMPERATURE: 97.1 F | HEART RATE: 75 BPM | SYSTOLIC BLOOD PRESSURE: 138 MMHG

## 2024-03-24 DIAGNOSIS — R05.1 ACUTE COUGH: ICD-10-CM

## 2024-03-24 DIAGNOSIS — J02.0 STREP PHARYNGITIS: ICD-10-CM

## 2024-03-24 LAB
FLUAV RNA SPEC QL NAA+PROBE: NEGATIVE
FLUBV RNA SPEC QL NAA+PROBE: NEGATIVE
RSV RNA SPEC QL NAA+PROBE: NEGATIVE
S PYO DNA SPEC NAA+PROBE: DETECTED
SARS-COV-2 RNA RESP QL NAA+PROBE: NEGATIVE

## 2024-03-24 RX ORDER — PROPRANOLOL HYDROCHLORIDE 10 MG/1
1 TABLET ORAL 3 TIMES DAILY
COMMUNITY
End: 2024-03-24

## 2024-03-24 RX ORDER — AMITRIPTYLINE HYDROCHLORIDE 10 MG/1
TABLET, FILM COATED ORAL
COMMUNITY
End: 2024-03-24

## 2024-03-24 RX ORDER — ARIPIPRAZOLE 5 MG/1
5 TABLET ORAL DAILY
COMMUNITY
End: 2024-03-24

## 2024-03-24 RX ORDER — METHOCARBAMOL 750 MG/1
TABLET, FILM COATED ORAL
COMMUNITY
End: 2024-03-24

## 2024-03-24 RX ORDER — RIMEGEPANT SULFATE 75 MG/75MG
TABLET, ORALLY DISINTEGRATING ORAL
COMMUNITY
Start: 2024-02-29

## 2024-03-24 RX ORDER — OXYCODONE HYDROCHLORIDE 10 MG/1
TABLET ORAL
COMMUNITY
End: 2024-03-24

## 2024-03-24 RX ORDER — ONABOTULINUMTOXINA 200 [USP'U]/1
INJECTION, POWDER, LYOPHILIZED, FOR SOLUTION INTRADERMAL; INTRAMUSCULAR
COMMUNITY
Start: 2024-03-05

## 2024-03-24 RX ORDER — AMOXICILLIN 500 MG/1
500 CAPSULE ORAL 2 TIMES DAILY
Qty: 20 CAPSULE | Refills: 0 | Status: SHIPPED | OUTPATIENT
Start: 2024-03-24 | End: 2024-04-03

## 2024-03-24 RX ORDER — DEXAMETHASONE SODIUM PHOSPHATE 10 MG/ML
10 INJECTION INTRAMUSCULAR; INTRAVENOUS ONCE
Status: COMPLETED | OUTPATIENT
Start: 2024-03-24 | End: 2024-03-24

## 2024-03-24 RX ORDER — DIAZEPAM 5 MG/1
TABLET ORAL
COMMUNITY
Start: 2024-01-30 | End: 2024-03-24

## 2024-03-24 RX ORDER — GABAPENTIN 600 MG/1
600 TABLET ORAL
COMMUNITY
Start: 2023-10-11 | End: 2024-03-24

## 2024-03-24 RX ORDER — METHOCARBAMOL 500 MG/1
500 TABLET, FILM COATED ORAL
COMMUNITY
End: 2024-03-24

## 2024-03-24 RX ORDER — GABAPENTIN 400 MG/1
400 CAPSULE ORAL 3 TIMES DAILY
COMMUNITY

## 2024-03-24 RX ADMIN — DEXAMETHASONE SODIUM PHOSPHATE 10 MG: 10 INJECTION INTRAMUSCULAR; INTRAVENOUS at 16:42

## 2024-03-24 ASSESSMENT — ENCOUNTER SYMPTOMS
VOMITING: 0
DIARRHEA: 0
CHILLS: 1
ABDOMINAL PAIN: 0
MYALGIAS: 1
SPUTUM PRODUCTION: 0
FEVER: 0
SORE THROAT: 1
SHORTNESS OF BREATH: 0
WHEEZING: 0
NAUSEA: 0
COUGH: 1
SINUS PAIN: 0
HEADACHES: 1

## 2024-03-24 ASSESSMENT — FIBROSIS 4 INDEX: FIB4 SCORE: 0.64

## 2024-03-24 ASSESSMENT — COPD QUESTIONNAIRES: COPD: 0

## 2024-03-24 NOTE — PROGRESS NOTES
"Subjective:   Edith Glass is a 43 y.o. female who presents for Sore Throat, Cough, and Runny Nose (X 3 days for all    swollen tongue )      Cough  This is a new problem. Episode onset: Thursday. The problem has been gradually worsening. The cough is Non-productive. Associated symptoms include chills, ear congestion, ear pain, headaches, myalgias, nasal congestion, postnasal drip and a sore throat. Pertinent negatives include no chest pain, fever, rash, shortness of breath or wheezing. She has tried OTC cough suppressant for the symptoms. Her past medical history is significant for bronchitis and pneumonia. There is no history of asthma, COPD or environmental allergies.       Review of Systems   Constitutional:  Positive for chills. Negative for fever and malaise/fatigue.   HENT:  Positive for congestion, ear pain, postnasal drip and sore throat. Negative for hearing loss and sinus pain.    Respiratory:  Positive for cough. Negative for sputum production, shortness of breath and wheezing.    Cardiovascular:  Negative for chest pain.   Gastrointestinal:  Negative for abdominal pain, diarrhea, nausea and vomiting.   Genitourinary:  Negative for dysuria.   Musculoskeletal:  Positive for myalgias.   Skin:  Negative for rash.   Neurological:  Positive for headaches.   Endo/Heme/Allergies:  Negative for environmental allergies.       Medications, Allergies, and current problem list reviewed today in Epic.     Objective:     /88   Pulse 75   Temp 36.2 °C (97.1 °F) (Temporal)   Resp 14   Ht 1.753 m (5' 9\")   Wt 88.5 kg (195 lb)   SpO2 98%     Physical Exam  Constitutional:       General: She is not in acute distress.     Appearance: Normal appearance. She is not ill-appearing.   HENT:      Head: Normocephalic and atraumatic.      Right Ear: Tympanic membrane, ear canal and external ear normal.      Left Ear: Tympanic membrane, ear canal and external ear normal.      Nose: Congestion present.      " Mouth/Throat:      Mouth: Mucous membranes are moist.      Pharynx: Oropharyngeal exudate and posterior oropharyngeal erythema present.   Eyes:      Conjunctiva/sclera: Conjunctivae normal.   Cardiovascular:      Rate and Rhythm: Normal rate.      Heart sounds: Normal heart sounds.   Pulmonary:      Effort: Pulmonary effort is normal. No respiratory distress.      Breath sounds: Normal breath sounds. No wheezing.   Abdominal:      General: Abdomen is flat.      Palpations: Abdomen is soft.   Musculoskeletal:         General: Normal range of motion.      Cervical back: Normal range of motion.   Skin:     General: Skin is warm and dry.      Capillary Refill: Capillary refill takes less than 2 seconds.   Neurological:      Mental Status: She is alert and oriented to person, place, and time.   Psychiatric:         Mood and Affect: Mood normal.         Behavior: Behavior normal.       Results for orders placed or performed in visit on 03/24/24   POCT CEPHEID GROUP A STREP - PCR   Result Value Ref Range    POC Group A Strep, PCR Detected (A) Not Detected, Invalid   POCT CoV-2, Flu A/B, RSV by PCR   Result Value Ref Range    SARS-CoV-2 by PCR Negative Negative, Invalid    Influenza virus A RNA Negative Negative, Invalid    Influenza virus B, PCR Negative Negative, Invalid    RSV, PCR Negative Negative, Invalid         Assessment/Plan:       1. Strep pharyngitis  POCT CEPHEID GROUP A STREP - PCR    amoxicillin (AMOXIL) 500 MG Cap      2. Acute cough  dexamethasone (Decadron) injection (check route below) 10 mg    POCT CoV-2, Flu A/B, RSV by PCR      Patient is a pleasant 43-year-old female who is here today with complaints of nasal congestion, sore throat, cough, and canker sores to mouth since Thursday.  Patient reports that she went out on a hike and after this hike she developed the symptoms.  Patient reports that she has been taking over-the-counter treatments with no relief.  Patient does report that she had an old  amoxicillin prescription and started taking this.  Patient denies any nausea/vomiting, chest pain, shortness of breath, or fevers.  Patient has no significant respiratory history.  After ROS and physical exam patient had nasal congestion with clear thin runny nose.  Patient had no maxillary or frontal sinus tenderness.  Patient has swollen bilateral nasal turbinates.  Patient did have noted erythema with exudate bilaterally to throat.  Patient had noted canker sores to to gums.  Patient's lung sounds were clear throughout via auscultation.  I assume patient has a viral infection.  Viral and strep swabs were performed in office and was positive for strep.  Patient was provided 10 mg of oral dexamethasone in office.  Patient provided over-the-counter treatment for nasal congestion.  This includes nondrowsy antihistamine, sinus rinses, and Flonase.  Amoxicillin prescription was sent for patient.    Take full course of antibiotic  Increase fluids and rest  Advil or Tylenol for fever and pain  OTC meds and conservative measures including lozenges, sprays, etc.  Replace toothbrush after 48 hours on antibiotics.    Patient to monitor for any worsening signs and symptoms and for any other concerns to follow-up with urgent care or emergency room for further management.    Differential diagnosis, natural history, and supportive care discussed. We also reviewed side effects of medication including allergic response, GI upset, tendon injury, rash, sedation etc. Patient and/or guardian voices understanding.      Advised the patient to follow-up with the primary care physician for recheck, reevaluation, and consideration of further management.    I personally reviewed prior external notes and test results pertinent to today's visit as well as additional imaging and testing completed in clinic today.     Please note that this dictation was created using voice recognition software. I have made every reasonable attempt to correct  obvious errors, but I expect that there are errors of grammar and possibly content that I did not discover before finalizing the note.    This note was electronically signed by LAKISHA Stewart

## 2024-06-10 ENCOUNTER — HOSPITAL ENCOUNTER (OUTPATIENT)
Facility: MEDICAL CENTER | Age: 44
End: 2024-06-10
Attending: FAMILY MEDICINE
Payer: MEDICAID

## 2024-06-10 ENCOUNTER — OFFICE VISIT (OUTPATIENT)
Dept: URGENT CARE | Facility: PHYSICIAN GROUP | Age: 44
End: 2024-06-10
Payer: MEDICAID

## 2024-06-10 VITALS
OXYGEN SATURATION: 95 % | WEIGHT: 197 LBS | HEART RATE: 85 BPM | TEMPERATURE: 97.5 F | DIASTOLIC BLOOD PRESSURE: 74 MMHG | RESPIRATION RATE: 16 BRPM | BODY MASS INDEX: 29.18 KG/M2 | SYSTOLIC BLOOD PRESSURE: 102 MMHG | HEIGHT: 69 IN

## 2024-06-10 DIAGNOSIS — R14.0 ABDOMINAL DISTENTION: ICD-10-CM

## 2024-06-10 DIAGNOSIS — K76.0 FATTY LIVER: ICD-10-CM

## 2024-06-10 DIAGNOSIS — R10.9 ABDOMINAL PAIN, UNSPECIFIED ABDOMINAL LOCATION: ICD-10-CM

## 2024-06-10 LAB
APPEARANCE UR: NORMAL
BILIRUB UR STRIP-MCNC: NORMAL MG/DL
COLOR UR AUTO: YELLOW
GLUCOSE UR STRIP.AUTO-MCNC: NORMAL MG/DL
KETONES UR STRIP.AUTO-MCNC: NORMAL MG/DL
LEUKOCYTE ESTERASE UR QL STRIP.AUTO: NORMAL
NITRITE UR QL STRIP.AUTO: NORMAL
PH UR STRIP.AUTO: 6 [PH] (ref 5–8)
POCT INT CON NEG: NEGATIVE
POCT INT CON POS: POSITIVE
POCT URINE PREGNANCY TEST: NEGATIVE
PROT UR QL STRIP: NORMAL MG/DL
RBC UR QL AUTO: NORMAL
SP GR UR STRIP.AUTO: 1.02
UROBILINOGEN UR STRIP-MCNC: 0.2 MG/DL

## 2024-06-10 PROCEDURE — 3074F SYST BP LT 130 MM HG: CPT | Performed by: FAMILY MEDICINE

## 2024-06-10 PROCEDURE — 87086 URINE CULTURE/COLONY COUNT: CPT

## 2024-06-10 PROCEDURE — 81002 URINALYSIS NONAUTO W/O SCOPE: CPT | Performed by: FAMILY MEDICINE

## 2024-06-10 PROCEDURE — 81025 URINE PREGNANCY TEST: CPT | Performed by: FAMILY MEDICINE

## 2024-06-10 PROCEDURE — 99214 OFFICE O/P EST MOD 30 MIN: CPT | Mod: 25 | Performed by: FAMILY MEDICINE

## 2024-06-10 PROCEDURE — 3078F DIAST BP <80 MM HG: CPT | Performed by: FAMILY MEDICINE

## 2024-06-10 ASSESSMENT — FIBROSIS 4 INDEX: FIB4 SCORE: 0.64

## 2024-06-11 ENCOUNTER — HOSPITAL ENCOUNTER (OUTPATIENT)
Dept: RADIOLOGY | Facility: MEDICAL CENTER | Age: 44
End: 2024-06-11
Attending: FAMILY MEDICINE
Payer: MEDICAID

## 2024-06-11 DIAGNOSIS — R10.9 ABDOMINAL PAIN, UNSPECIFIED ABDOMINAL LOCATION: ICD-10-CM

## 2024-06-11 PROCEDURE — 74177 CT ABD & PELVIS W/CONTRAST: CPT

## 2024-06-11 PROCEDURE — 700117 HCHG RX CONTRAST REV CODE 255: Mod: UD | Performed by: FAMILY MEDICINE

## 2024-06-11 RX ADMIN — IOHEXOL 25 ML: 240 INJECTION, SOLUTION INTRATHECAL; INTRAVASCULAR; INTRAVENOUS; ORAL at 15:57

## 2024-06-11 RX ADMIN — IOHEXOL 100 ML: 350 INJECTION, SOLUTION INTRAVENOUS at 15:57

## 2024-06-11 NOTE — PROGRESS NOTES
"Subjective:     Chief Complaint   Patient presents with    Bloated     X 1.5 weeks with distended abdomen, pain, nausea and \" pale in color poop \".  She said she is taking Tizanidine and she thinks it may be the cause.        Abdominal Pain  This is a new problem.       + pale colored stools one week ago.       + distention    Appetite is good, but has pain and nausea with eating.       2011 - cholecystectomy    Previous heavy drinker.    Clean x 1 yr.          The pain is located in the periumbilical region. The pain is mild. The quality of the pain is described as pressure.   She notes significant abdominal \"swelling\"       The pain does not radiate. Associated symptoms include headaches. Pertinent negatives include no belching, constipation, diarrhea, dysuria, fever, hematochezia, hematuria, nausea or sore throat. Nothing relieves the symptoms. Past treatments include nothing.     Social History     Tobacco Use    Smoking status: Every Day     Current packs/day: 0.50     Average packs/day: 0.5 packs/day for 30.0 years (15.0 ttl pk-yrs)     Types: Cigarettes     Passive exposure: Current    Smokeless tobacco: Never   Vaping Use    Vaping status: Every Day    Substances: Nicotine, Flavoring    Devices: Disposable    Passive vaping exposure: Yes   Substance Use Topics    Alcohol use: No    Drug use: No           Current Outpatient Medications on File Prior to Visit   Medication Sig Dispense Refill    gabapentin (NEURONTIN) 400 MG Cap Take 400 mg by mouth 3 times a day.      onabotulinum toxin A (BOTOX) 200 units Recon Soln injection give 155 units IM per  FDA approved paradigm for treatment of chronic migraine disorder q.12 weeks.      NURTEC 75 MG TABLET DISPERSIBLE TAKE 1 TABLET BY MOUTH AT ONSET OF HEADACHE. OKAY TO REPEAT IN 24 HOURS IF NEEDED.      sertraline (ZOLOFT) 50 MG Tab TAKE 1 TABLET BY MOUTH ONCE DAILY WITH 100 MG TABLET      tizanidine (ZANAFLEX) 2 MG tablet Take 1 Tablet by mouth 3 times a day as " "needed (neck pain). 90 Tablet 3    AJOVY 225 MG/1.5ML Solution Auto-injector INJECT 1 PEN SUBCUTANEOUSLY ONCE MONTHLY.      sertraline (ZOLOFT) 100 MG Tab Take 100 mg by mouth every day. TAKE W/50MG      albuterol 108 (90 BASE) MCG/ACT Aero Soln inhalation aerosol Inhale 2 Puffs by mouth every 6 hours as needed for Shortness of Breath. 8.5 g 3     No current facility-administered medications on file prior to visit.       Family History   Problem Relation Age of Onset    Heart Disease Mother          Allergies   Allergen Reactions    Amoxicillin     Morphine     Nkda [No Known Drug Allergy]          Review of Systems   Constitutional: Negative for fever.   HENT: Negative for sore throat.    Gastrointestinal: Positive for abdominal pain. Negative for nausea, diarrhea, constipation and hematochezia.   Genitourinary: Negative for dysuria and hematuria.   Neurological: denies dizziness, confusion, disorientation.   No extremity weakness or numbness  All other systems reviewed and are negative.         Objective:     /74   Pulse 85   Temp 36.4 °C (97.5 °F) (Temporal)   Resp 16   Ht 1.753 m (5' 9\")   Wt 89.4 kg (197 lb)   SpO2 95%       Physical Exam   Constitutional: pt appears well-developed. No distress.   HENT:   Nose: No nasal discharge.   Mouth/Throat: Mucous membranes are moist. Oropharynx is clear.   Eyes: Conjunctivae and EOM are normal. Pupils are equal, round, and reactive to light. Right eye exhibits no discharge. Left eye exhibits no discharge.   Neck: Neck supple.   Cardiovascular: Normal rate, regular rhythm, S1 normal and S2 normal.    Pulmonary/Chest: Effort normal and breath sounds normal. There is normal air entry. No respiratory distress.   Abdominal: Soft. There is tenderness in the periumbilical area. bowel sounds are present.   No liver or spleen enlargement .  No rebound and no guarding.   There is no pain over McBurney's point  Lymphadenopathy:     Pt has no  adenopathy.   Neurological: " pt is alert and orientated x3 . No cranial nerve deficit.   Skin: Skin is warm and moist. No petechiae and no rash noted.   not diaphoretic. No jaundice.   Nursing note and vitals reviewed.         CT-ABDOMEN-PELVIS WITH  Order: [id number]   Status: Final result       Visible to patient: Yes (seen)       Next appt: None       Dx: Abdominal pain, unspecified abdominal...    0 Result Notes  Details    Reading Physician Reading Date Result Priority   Lan Fields M.D.  [phone] 6/11/2024      Narrative & Impression     6/11/2024 3:30 PM     HISTORY/REASON FOR EXAM:  abd pain, pale colored stools; please text stat report to dr. johansen on voalte me.        TECHNIQUE/EXAM DESCRIPTION:   CT scan of the abdomen and pelvis with contrast.     Contrast-enhanced helical scanning was obtained from the diaphragmatic domes through the pubic symphysis following the bolus administration of nonionic contrast without complication.     100 mL of Omnipaque 350 nonionic contrast was administered without complication.     Low dose optimization technique was utilized for this CT exam including automated exposure control and adjustment of the mA and/or kV according to patient size.     COMPARISON: CT abdomen pelvis 11/12/2009     FINDINGS:  Lower Chest: Unremarkable.     Liver: Hepatic fatty infiltration. 23 cm craniocaudal.     Spleen: Unremarkable.     Pancreas: Unremarkable.     Gallbladder: The gallbladder has been resected.     Biliary: Nondilated.     Adrenal glands: Normal.     Kidneys: No hydronephrosis. Nonobstructing 6 mm left renal stone. Too small to characterize left superior pole renal hypodensity.     Bowel: Contrast in the small bowel. No obstruction or acute inflammation. Normal appendix.     Lymph nodes: No adenopathy.     Vasculature: Unremarkable.     Peritoneum: Unremarkable without ascites.     Musculoskeletal: No acute or destructive process.     Pelvis: No adenopathy or free fluid. 2.6 cm left adnexal cyst.  No follow-up required.           IMPRESSION:     1.  No acute inflammation in the abdomen or pelvis.  2.  Hepatomegaly. Mild hepatic steatosis.  3.  Nonobstructing 6 mm left renal stone.     Findings conveyed to Dr. Sidhu at 6/11/2024 4:23 PM via Voalte messaging.           Exam Ended: 06/11/24  3:55 PM Last Resulted: 06/11/24  4:24 PM            Lab Results   Component Value Date/Time    POCCOLOR yellow 06/10/2024 06:22 PM    POCAPPEAR cloudy 06/10/2024 06:22 PM    POCLEUKEST neg 06/10/2024 06:22 PM    POCNITRITE neg 06/10/2024 06:22 PM    POCUROBILIGE 0.2 06/10/2024 06:22 PM    POCPROTEIN neg 06/10/2024 06:22 PM    POCURPH 6.0 06/10/2024 06:22 PM    POCBLOOD neg 06/10/2024 06:22 PM    POCSPGRV 1.025 06/10/2024 06:22 PM    POCKETONES neg 06/10/2024 06:22 PM    POCBILIRUBIN neg 06/10/2024 06:22 PM    POCGLUCUA neg 06/10/2024 06:22 PM           Assessment/Plan:           1. Abdominal pain, unspecified abdominal location   UA unremarkable.     Urine sent for culture      CT personally reviewed  with radiologist.   No acute findings.   No indication of ovarian Cancer.    Liver is enlarged.  There is  a non-obstructing 6 mm left renal stone found incidentally.     Referred to GI     Will call pt with lab results.     Advised to go to ED if sx worsen    2. Fatty liver  Encouraged to continue to abstain from alcohol.   Encouraged wt loss    Check LFTs    Referred to GI    - CBC WITH DIFFERENTIAL; Future  - Comp Metabolic Panel; Future  - LIPASE; Future  - CT-ABDOMEN-PELVIS WITH; Future  - HEPATITIS PANEL ACUTE(4 COMPONENTS); Future

## 2024-06-12 ENCOUNTER — HOSPITAL ENCOUNTER (OUTPATIENT)
Dept: LAB | Facility: MEDICAL CENTER | Age: 44
End: 2024-06-12
Attending: FAMILY MEDICINE
Payer: MEDICAID

## 2024-06-12 DIAGNOSIS — R10.9 ABDOMINAL PAIN, UNSPECIFIED ABDOMINAL LOCATION: ICD-10-CM

## 2024-06-12 LAB
ALBUMIN SERPL BCP-MCNC: 4.3 G/DL (ref 3.2–4.9)
ALBUMIN/GLOB SERPL: 1.1 G/DL
ALP SERPL-CCNC: 75 U/L (ref 30–99)
ALT SERPL-CCNC: 20 U/L (ref 2–50)
ANION GAP SERPL CALC-SCNC: 14 MMOL/L (ref 7–16)
AST SERPL-CCNC: 22 U/L (ref 12–45)
BASOPHILS # BLD AUTO: 0.6 % (ref 0–1.8)
BASOPHILS # BLD: 0.07 K/UL (ref 0–0.12)
BILIRUB SERPL-MCNC: 0.5 MG/DL (ref 0.1–1.5)
BUN SERPL-MCNC: 8 MG/DL (ref 8–22)
CALCIUM ALBUM COR SERPL-MCNC: 9.9 MG/DL (ref 8.5–10.5)
CALCIUM SERPL-MCNC: 10.1 MG/DL (ref 8.5–10.5)
CHLORIDE SERPL-SCNC: 103 MMOL/L (ref 96–112)
CO2 SERPL-SCNC: 22 MMOL/L (ref 20–33)
CREAT SERPL-MCNC: 0.5 MG/DL (ref 0.5–1.4)
EOSINOPHIL # BLD AUTO: 0.38 K/UL (ref 0–0.51)
EOSINOPHIL NFR BLD: 3.4 % (ref 0–6.9)
ERYTHROCYTE [DISTWIDTH] IN BLOOD BY AUTOMATED COUNT: 44.9 FL (ref 35.9–50)
GFR SERPLBLD CREATININE-BSD FMLA CKD-EPI: 119 ML/MIN/1.73 M 2
GLOBULIN SER CALC-MCNC: 3.8 G/DL (ref 1.9–3.5)
GLUCOSE SERPL-MCNC: 103 MG/DL (ref 65–99)
HCT VFR BLD AUTO: 48 % (ref 37–47)
HGB BLD-MCNC: 15.8 G/DL (ref 12–16)
IMM GRANULOCYTES # BLD AUTO: 0.05 K/UL (ref 0–0.11)
IMM GRANULOCYTES NFR BLD AUTO: 0.4 % (ref 0–0.9)
LIPASE SERPL-CCNC: 45 U/L (ref 11–82)
LYMPHOCYTES # BLD AUTO: 3.4 K/UL (ref 1–4.8)
LYMPHOCYTES NFR BLD: 30.4 % (ref 22–41)
MCH RBC QN AUTO: 27.7 PG (ref 27–33)
MCHC RBC AUTO-ENTMCNC: 32.9 G/DL (ref 32.2–35.5)
MCV RBC AUTO: 84.2 FL (ref 81.4–97.8)
MONOCYTES # BLD AUTO: 0.75 K/UL (ref 0–0.85)
MONOCYTES NFR BLD AUTO: 6.7 % (ref 0–13.4)
NEUTROPHILS # BLD AUTO: 6.54 K/UL (ref 1.82–7.42)
NEUTROPHILS NFR BLD: 58.5 % (ref 44–72)
NRBC # BLD AUTO: 0 K/UL
NRBC BLD-RTO: 0 /100 WBC (ref 0–0.2)
PLATELET # BLD AUTO: 355 K/UL (ref 164–446)
PMV BLD AUTO: 10.2 FL (ref 9–12.9)
POTASSIUM SERPL-SCNC: 3.9 MMOL/L (ref 3.6–5.5)
PROT SERPL-MCNC: 8.1 G/DL (ref 6–8.2)
RBC # BLD AUTO: 5.7 M/UL (ref 4.2–5.4)
SODIUM SERPL-SCNC: 139 MMOL/L (ref 135–145)
WBC # BLD AUTO: 11.2 K/UL (ref 4.8–10.8)

## 2024-06-12 PROCEDURE — 83690 ASSAY OF LIPASE: CPT

## 2024-06-12 PROCEDURE — 80053 COMPREHEN METABOLIC PANEL: CPT

## 2024-06-12 PROCEDURE — 80074 ACUTE HEPATITIS PANEL: CPT

## 2024-06-12 PROCEDURE — 36415 COLL VENOUS BLD VENIPUNCTURE: CPT

## 2024-06-12 PROCEDURE — 85025 COMPLETE CBC W/AUTO DIFF WBC: CPT

## 2024-06-13 ENCOUNTER — HOSPITAL ENCOUNTER (INPATIENT)
Facility: MEDICAL CENTER | Age: 44
LOS: 2 days | DRG: 918 | End: 2024-06-16
Attending: EMERGENCY MEDICINE | Admitting: HOSPITALIST
Payer: MEDICAID

## 2024-06-13 DIAGNOSIS — G92.9 TOXIC ENCEPHALOPATHY: ICD-10-CM

## 2024-06-13 DIAGNOSIS — R45.851 SUICIDAL IDEATION: ICD-10-CM

## 2024-06-13 DIAGNOSIS — T50.902A INTENTIONAL DRUG OVERDOSE, INITIAL ENCOUNTER (HCC): ICD-10-CM

## 2024-06-13 PROBLEM — R00.1 BRADYCARDIA: Status: ACTIVE | Noted: 2024-06-13

## 2024-06-13 PROBLEM — T46.2X2A: Status: ACTIVE | Noted: 2024-06-13

## 2024-06-13 PROBLEM — E86.0 DEHYDRATION: Status: ACTIVE | Noted: 2024-06-13

## 2024-06-13 PROBLEM — F32.A DEPRESSION: Status: ACTIVE | Noted: 2024-06-13

## 2024-06-13 PROBLEM — T14.91XA SUICIDE ATTEMPT (HCC): Status: ACTIVE | Noted: 2024-06-13

## 2024-06-13 LAB
ALBUMIN SERPL BCP-MCNC: 3.8 G/DL (ref 3.2–4.9)
ALBUMIN/GLOB SERPL: 1.1 G/DL
ALP SERPL-CCNC: 69 U/L (ref 30–99)
ALT SERPL-CCNC: 14 U/L (ref 2–50)
ANION GAP SERPL CALC-SCNC: 12 MMOL/L (ref 7–16)
APAP SERPL-MCNC: <5 UG/ML (ref 10–30)
AST SERPL-CCNC: 17 U/L (ref 12–45)
BACTERIA UR CULT: NORMAL
BASOPHILS # BLD AUTO: 0.4 % (ref 0–1.8)
BASOPHILS # BLD: 0.05 K/UL (ref 0–0.12)
BILIRUB SERPL-MCNC: 0.3 MG/DL (ref 0.1–1.5)
BUN SERPL-MCNC: 6 MG/DL (ref 8–22)
CALCIUM ALBUM COR SERPL-MCNC: 9.2 MG/DL (ref 8.5–10.5)
CALCIUM SERPL-MCNC: 9 MG/DL (ref 8.5–10.5)
CHLORIDE SERPL-SCNC: 104 MMOL/L (ref 96–112)
CO2 SERPL-SCNC: 21 MMOL/L (ref 20–33)
CREAT SERPL-MCNC: 0.55 MG/DL (ref 0.5–1.4)
EKG IMPRESSION: NORMAL
EOSINOPHIL # BLD AUTO: 0.25 K/UL (ref 0–0.51)
EOSINOPHIL NFR BLD: 1.9 % (ref 0–6.9)
ERYTHROCYTE [DISTWIDTH] IN BLOOD BY AUTOMATED COUNT: 44.6 FL (ref 35.9–50)
ETHANOL BLD-MCNC: <10.1 MG/DL
GFR SERPLBLD CREATININE-BSD FMLA CKD-EPI: 116 ML/MIN/1.73 M 2
GLOBULIN SER CALC-MCNC: 3.5 G/DL (ref 1.9–3.5)
GLUCOSE SERPL-MCNC: 100 MG/DL (ref 65–99)
HAV IGM SERPL QL IA: NORMAL
HBV CORE IGM SER QL: NORMAL
HBV SURFACE AG SER QL: NORMAL
HCG SERPL QL: NEGATIVE
HCT VFR BLD AUTO: 41.9 % (ref 37–47)
HCV AB SER QL: NORMAL
HGB BLD-MCNC: 14.2 G/DL (ref 12–16)
IMM GRANULOCYTES # BLD AUTO: 0.04 K/UL (ref 0–0.11)
IMM GRANULOCYTES NFR BLD AUTO: 0.3 % (ref 0–0.9)
LYMPHOCYTES # BLD AUTO: 3 K/UL (ref 1–4.8)
LYMPHOCYTES NFR BLD: 23.4 % (ref 22–41)
MCH RBC QN AUTO: 28.3 PG (ref 27–33)
MCHC RBC AUTO-ENTMCNC: 33.9 G/DL (ref 32.2–35.5)
MCV RBC AUTO: 83.5 FL (ref 81.4–97.8)
MONOCYTES # BLD AUTO: 0.93 K/UL (ref 0–0.85)
MONOCYTES NFR BLD AUTO: 7.2 % (ref 0–13.4)
NEUTROPHILS # BLD AUTO: 8.56 K/UL (ref 1.82–7.42)
NEUTROPHILS NFR BLD: 66.8 % (ref 44–72)
NRBC # BLD AUTO: 0 K/UL
NRBC BLD-RTO: 0 /100 WBC (ref 0–0.2)
PLATELET # BLD AUTO: 293 K/UL (ref 164–446)
PMV BLD AUTO: 9.2 FL (ref 9–12.9)
POC BREATHALIZER: 0 PERCENT (ref 0–0.01)
POTASSIUM SERPL-SCNC: 4 MMOL/L (ref 3.6–5.5)
PROT SERPL-MCNC: 7.3 G/DL (ref 6–8.2)
RBC # BLD AUTO: 5.02 M/UL (ref 4.2–5.4)
SALICYLATES SERPL-MCNC: <1 MG/DL (ref 15–25)
SIGNIFICANT IND 70042: NORMAL
SITE SITE: NORMAL
SODIUM SERPL-SCNC: 137 MMOL/L (ref 135–145)
SOURCE SOURCE: NORMAL
WBC # BLD AUTO: 12.8 K/UL (ref 4.8–10.8)

## 2024-06-13 PROCEDURE — 99285 EMERGENCY DEPT VISIT HI MDM: CPT

## 2024-06-13 PROCEDURE — 700111 HCHG RX REV CODE 636 W/ 250 OVERRIDE (IP): Mod: UD | Performed by: HOSPITALIST

## 2024-06-13 PROCEDURE — 93005 ELECTROCARDIOGRAM TRACING: CPT | Performed by: EMERGENCY MEDICINE

## 2024-06-13 PROCEDURE — 36415 COLL VENOUS BLD VENIPUNCTURE: CPT

## 2024-06-13 PROCEDURE — 96372 THER/PROPH/DIAG INJ SC/IM: CPT

## 2024-06-13 PROCEDURE — 700101 HCHG RX REV CODE 250: Mod: UD

## 2024-06-13 PROCEDURE — 80179 DRUG ASSAY SALICYLATE: CPT

## 2024-06-13 PROCEDURE — 80143 DRUG ASSAY ACETAMINOPHEN: CPT

## 2024-06-13 PROCEDURE — 82077 ASSAY SPEC XCP UR&BREATH IA: CPT

## 2024-06-13 PROCEDURE — 302970 POC BREATHALIZER: Performed by: EMERGENCY MEDICINE

## 2024-06-13 PROCEDURE — 85025 COMPLETE CBC W/AUTO DIFF WBC: CPT

## 2024-06-13 PROCEDURE — 700105 HCHG RX REV CODE 258: Mod: UD | Performed by: HOSPITALIST

## 2024-06-13 PROCEDURE — 84703 CHORIONIC GONADOTROPIN ASSAY: CPT

## 2024-06-13 PROCEDURE — G0378 HOSPITAL OBSERVATION PER HR: HCPCS

## 2024-06-13 PROCEDURE — 80053 COMPREHEN METABOLIC PANEL: CPT

## 2024-06-13 PROCEDURE — 99223 1ST HOSP IP/OBS HIGH 75: CPT | Performed by: HOSPITALIST

## 2024-06-13 PROCEDURE — 700105 HCHG RX REV CODE 258: Mod: UD | Performed by: EMERGENCY MEDICINE

## 2024-06-13 PROCEDURE — 93005 ELECTROCARDIOGRAM TRACING: CPT | Performed by: HOSPITALIST

## 2024-06-13 RX ORDER — POLYETHYLENE GLYCOL 3350 17 G/17G
1 POWDER, FOR SOLUTION ORAL
Status: DISCONTINUED | OUTPATIENT
Start: 2024-06-13 | End: 2024-06-15

## 2024-06-13 RX ORDER — SODIUM CHLORIDE 9 MG/ML
1000 INJECTION, SOLUTION INTRAVENOUS ONCE
Status: COMPLETED | OUTPATIENT
Start: 2024-06-13 | End: 2024-06-13

## 2024-06-13 RX ORDER — TIZANIDINE 4 MG/1
8 TABLET ORAL
Status: SHIPPED | COMMUNITY
End: 2024-06-13

## 2024-06-13 RX ORDER — ALBUTEROL SULFATE 90 UG/1
2 AEROSOL, METERED RESPIRATORY (INHALATION) EVERY 4 HOURS PRN
Status: DISCONTINUED | OUTPATIENT
Start: 2024-06-13 | End: 2024-06-16 | Stop reason: HOSPADM

## 2024-06-13 RX ORDER — DIPHENHYDRAMINE HCL 25 MG
25 TABLET ORAL EVERY 6 HOURS PRN
Status: DISCONTINUED | OUTPATIENT
Start: 2024-06-13 | End: 2024-06-13

## 2024-06-13 RX ORDER — NICOTINE 21 MG/24HR
21 PATCH, TRANSDERMAL 24 HOURS TRANSDERMAL
Status: DISCONTINUED | OUTPATIENT
Start: 2024-06-13 | End: 2024-06-16 | Stop reason: HOSPADM

## 2024-06-13 RX ORDER — SODIUM CHLORIDE 9 MG/ML
INJECTION, SOLUTION INTRAVENOUS CONTINUOUS
Status: ACTIVE | OUTPATIENT
Start: 2024-06-13 | End: 2024-06-14

## 2024-06-13 RX ORDER — ACETAMINOPHEN 325 MG/1
650 TABLET ORAL EVERY 6 HOURS PRN
Status: DISCONTINUED | OUTPATIENT
Start: 2024-06-13 | End: 2024-06-16

## 2024-06-13 RX ORDER — HEPARIN SODIUM 5000 [USP'U]/ML
5000 INJECTION, SOLUTION INTRAVENOUS; SUBCUTANEOUS EVERY 8 HOURS
Status: DISCONTINUED | OUTPATIENT
Start: 2024-06-13 | End: 2024-06-15

## 2024-06-13 RX ORDER — DIPHENHYDRAMINE HCL 25 MG
50 TABLET ORAL
Status: ON HOLD | COMMUNITY
End: 2024-06-15

## 2024-06-13 RX ORDER — LIDOCAINE 4 G/G
1 PATCH TOPICAL EVERY 24 HOURS
Status: DISCONTINUED | OUTPATIENT
Start: 2024-06-13 | End: 2024-06-16 | Stop reason: HOSPADM

## 2024-06-13 RX ORDER — AMOXICILLIN 250 MG
2 CAPSULE ORAL 2 TIMES DAILY
Status: DISCONTINUED | OUTPATIENT
Start: 2024-06-13 | End: 2024-06-15

## 2024-06-13 RX ADMIN — HEPARIN SODIUM 5000 UNITS: 5000 INJECTION, SOLUTION INTRAVENOUS; SUBCUTANEOUS at 22:14

## 2024-06-13 RX ADMIN — SODIUM CHLORIDE 1000 ML: 9 INJECTION, SOLUTION INTRAVENOUS at 13:57

## 2024-06-13 RX ADMIN — LIDOCAINE 1 PATCH: 4 PATCH TOPICAL at 20:44

## 2024-06-13 RX ADMIN — SODIUM CHLORIDE 1000 ML: 9 INJECTION, SOLUTION INTRAVENOUS at 20:03

## 2024-06-13 SDOH — ECONOMIC STABILITY: TRANSPORTATION INSECURITY
IN THE PAST 12 MONTHS, HAS LACK OF RELIABLE TRANSPORTATION KEPT YOU FROM MEDICAL APPOINTMENTS, MEETINGS, WORK OR FROM GETTING THINGS NEEDED FOR DAILY LIVING?: NO

## 2024-06-13 SDOH — ECONOMIC STABILITY: TRANSPORTATION INSECURITY
IN THE PAST 12 MONTHS, HAS THE LACK OF TRANSPORTATION KEPT YOU FROM MEDICAL APPOINTMENTS OR FROM GETTING MEDICATIONS?: NO

## 2024-06-13 ASSESSMENT — COPD QUESTIONNAIRES
HAVE YOU SMOKED AT LEAST 100 CIGARETTES IN YOUR ENTIRE LIFE: NO/DON'T KNOW
DO YOU EVER COUGH UP ANY MUCUS OR PHLEGM?: NO/ONLY WITH OCCASIONAL COLDS OR INFECTIONS
DURING THE PAST 4 WEEKS HOW MUCH DID YOU FEEL SHORT OF BREATH: NONE/LITTLE OF THE TIME
COPD SCREENING SCORE: 0

## 2024-06-13 ASSESSMENT — LIFESTYLE VARIABLES
AVERAGE NUMBER OF DAYS PER WEEK YOU HAVE A DRINK CONTAINING ALCOHOL: 0
HOW MANY TIMES IN THE PAST YEAR HAVE YOU HAD 5 OR MORE DRINKS IN A DAY: 0
TOTAL SCORE: 0
EVER FELT BAD OR GUILTY ABOUT YOUR DRINKING: NO
ON A TYPICAL DAY WHEN YOU DRINK ALCOHOL HOW MANY DRINKS DO YOU HAVE: 0
HAVE YOU EVER FELT YOU SHOULD CUT DOWN ON YOUR DRINKING: NO
CONSUMPTION TOTAL: NEGATIVE
TOTAL SCORE: 0
DOES PATIENT WANT TO STOP DRINKING: NO
TOTAL SCORE: 0
EVER HAD A DRINK FIRST THING IN THE MORNING TO STEADY YOUR NERVES TO GET RID OF A HANGOVER: NO
HAVE PEOPLE ANNOYED YOU BY CRITICIZING YOUR DRINKING: NO
ALCOHOL_USE: NO

## 2024-06-13 ASSESSMENT — SOCIAL DETERMINANTS OF HEALTH (SDOH)
WITHIN THE LAST YEAR, HAVE YOU BEEN HUMILIATED OR EMOTIONALLY ABUSED IN OTHER WAYS BY YOUR PARTNER OR EX-PARTNER?: NO
IN THE PAST 12 MONTHS, HAS THE ELECTRIC, GAS, OIL, OR WATER COMPANY THREATENED TO SHUT OFF SERVICE IN YOUR HOME?: NO
WITHIN THE LAST YEAR, HAVE TO BEEN RAPED OR FORCED TO HAVE ANY KIND OF SEXUAL ACTIVITY BY YOUR PARTNER OR EX-PARTNER?: NO
WITHIN THE LAST YEAR, HAVE YOU BEEN KICKED, HIT, SLAPPED, OR OTHERWISE PHYSICALLY HURT BY YOUR PARTNER OR EX-PARTNER?: NO
WITHIN THE PAST 12 MONTHS, THE FOOD YOU BOUGHT JUST DIDN'T LAST AND YOU DIDN'T HAVE MONEY TO GET MORE: NEVER TRUE
WITHIN THE LAST YEAR, HAVE YOU BEEN AFRAID OF YOUR PARTNER OR EX-PARTNER?: NO
WITHIN THE PAST 12 MONTHS, YOU WORRIED THAT YOUR FOOD WOULD RUN OUT BEFORE YOU GOT THE MONEY TO BUY MORE: NEVER TRUE

## 2024-06-13 ASSESSMENT — FIBROSIS 4 INDEX
FIB4 SCORE: 0.67
FIB4 SCORE: 0.67

## 2024-06-13 ASSESSMENT — PAIN DESCRIPTION - PAIN TYPE
TYPE: ACUTE PAIN
TYPE: ACUTE PAIN

## 2024-06-13 ASSESSMENT — ENCOUNTER SYMPTOMS
ABDOMINAL PAIN: 0
VOMITING: 0
STRIDOR: 0
EYE DISCHARGE: 0
FOCAL WEAKNESS: 0
MYALGIAS: 0
DEPRESSION: 1
FLANK PAIN: 0
EYE REDNESS: 0
INSOMNIA: 1
BRUISES/BLEEDS EASILY: 0
SHORTNESS OF BREATH: 0
COUGH: 0
FEVER: 0
CHILLS: 0

## 2024-06-13 ASSESSMENT — PATIENT HEALTH QUESTIONNAIRE - PHQ9
SUM OF ALL RESPONSES TO PHQ9 QUESTIONS 1 AND 2: 6
4. FEELING TIRED OR HAVING LITTLE ENERGY: SEVERAL DAYS
8. MOVING OR SPEAKING SO SLOWLY THAT OTHER PEOPLE COULD HAVE NOTICED. OR THE OPPOSITE, BEING SO FIGETY OR RESTLESS THAT YOU HAVE BEEN MOVING AROUND A LOT MORE THAN USUAL: NOT AT ALL
1. LITTLE INTEREST OR PLEASURE IN DOING THINGS: NEARLY EVERY DAY
3. TROUBLE FALLING OR STAYING ASLEEP OR SLEEPING TOO MUCH: NOT AT ALL
5. POOR APPETITE OR OVEREATING: MORE THAN HALF THE DAYS
SUM OF ALL RESPONSES TO PHQ QUESTIONS 1-9: 15
7. TROUBLE CONCENTRATING ON THINGS, SUCH AS READING THE NEWSPAPER OR WATCHING TELEVISION: MORE THAN HALF THE DAYS
2. FEELING DOWN, DEPRESSED, IRRITABLE, OR HOPELESS: NEARLY EVERY DAY
6. FEELING BAD ABOUT YOURSELF - OR THAT YOU ARE A FAILURE OR HAVE LET YOURSELF OR YOUR FAMILY DOWN: NEARLY EVERY DAY
9. THOUGHTS THAT YOU WOULD BE BETTER OFF DEAD, OR OF HURTING YOURSELF: SEVERAL DAYS

## 2024-06-13 NOTE — ED TRIAGE NOTES
Chief Complaint   Patient presents with    Suicidal Ideation     Pt BIBA for SI attempt by OD. Pt states she took clonidine, gabapentin, and tizanidine. GCS 15.

## 2024-06-13 NOTE — ED PROVIDER NOTES
"ED Provider Note    CHIEF COMPLAINT  Chief Complaint   Patient presents with    Suicidal Ideation     Pt BIBA for SI attempt by OD. Pt states she took clonidine, gabapentin, and tizanidine. GCS 15.        HPI  Edith Glass is a 43 y.o. female who presents for suicidal ideation.  Patient apparently took clonidine, gabapentin and tizanidine.  She took \"a couple handfuls\" but does not know how many of each and does not know the dosage.  She took them around 1130 with intent to end her life because she \"feel like a failure as a mother.\"  EXTERNAL RECORDS REVIEWED  Reviewed last urgent care visit on the 10th of this month for a bloated sensation.  ROS  Constitutional: No fevers or chills  Skin: No rashes  HEENT: No sore throat, or runny nose  Neck: No neck pain  Chest: No pain or rashes  Pulm: No shortness of breath, cough, wheezing, stridor, or pain with inspiration/expiration  Gastrointestinal: No nausea, vomiting, diarrhea, constipation, bloating, melena, hematochezia or abdominal pain.  Genitourinary: No dysuria or hematuria  Musculoskeletal: No pain, swelling, or focal weakness  Neurologic: No sensory or focal motor changes to extremities. No confusion or disorientation.  Psych: Suicidal  Heme: No bleeding or bruising problems.   Immuno: No hx of recurrent infections        LIMITATION TO HISTORY   None  OUTSIDE HISTORIAN(S):  None        PAST FAM HISTORY  Family History   Problem Relation Age of Onset    Heart Disease Mother        PAST MEDICAL HISTORY   has a past medical history of Adverse effect of anesthesia, Heart burn, Indigestion, Psychiatric disorder (depression), and Sleep apnea.    SOCIAL HISTORY  Social History     Tobacco Use    Smoking status: Every Day     Current packs/day: 0.50     Average packs/day: 0.5 packs/day for 30.0 years (15.0 ttl pk-yrs)     Types: Cigarettes     Passive exposure: Current    Smokeless tobacco: Never   Vaping Use    Vaping status: Every Day    Substances: Nicotine, " Flavoring    Devices: Disposable    Passive vaping exposure: Yes   Substance and Sexual Activity    Alcohol use: No    Drug use: No    Sexual activity: Yes       SURGICAL HISTORY   has a past surgical history that includes other orthopedic surgery (2005); hysterectomy laparoscopy; cholecystectomy; and cervical disk and fusion anterior (N/A, 10/10/2023).    CURRENT MEDICATIONS  Home Medications       Reviewed by Hayley Prakash (Pharmacy Tech) on 06/13/24 at 1837  Med List Status: Complete     Medication Last Dose Status   AJOVY 225 MG/1.5ML Solution Auto-injector 2 weeks Active   albuterol 108 (90 BASE) MCG/ACT Aero Soln inhalation aerosol prn Active   diphenhydrAMINE (BENADRYL) 25 MG Tab 6/12/2024 Active   gabapentin (NEURONTIN) 400 MG Cap 6/12/2024 Active   NURTEC 75 MG TABLET DISPERSIBLE prn Active   onabotulinum toxin A (BOTOX) 200 units Recon Soln injection a month & a half ago Active   sertraline (ZOLOFT) 100 MG Tab 6/13/2024 Active   sertraline (ZOLOFT) 50 MG Tab 6/13/2024 Active   tizanidine (ZANAFLEX) 2 MG tablet 6/12/2024 Active                     ALLERGIES  Allergies   Allergen Reactions    Morphine     Nkda [No Known Drug Allergy]        PHYSICAL EXAM  VITAL SIGNS: BP 94/57   Pulse (!) 49   Temp 36.4 °C (97.6 °F) (Temporal)   Resp 17   Wt 90 kg (198 lb 6.6 oz)   LMP 11/05/2009   SpO2 94%   BMI 29.30 kg/m²    Gen: Appears very tired, falls asleep during conversation.  HEENT: No signs of trauma, Bilateral external ears normal, Nose normal. Conjunctiva normal, Non-icteric.  Pupils equal.  Cardiovascular: Bradycardia with regular rhythm, no murmurs.  Capillary refill less than 3 seconds to all extremities, 2+ distal pulses.  Thorax & Lungs: Normal breath sounds, No respiratory distress, No wheezing bilateral chest rise  Abdomen: Bowel sounds normal, Soft, No tenderness, No masses, No pulsatile masses. No Guarding or rebound  Skin: Warm, Dry, No erythema, No rash noted to exposed areas.    Extremities: Intact distal pulses, No edema  Neurologic: Very groggy, no facial droop.  Moves all extremities to command and spontaneously.    INITIAL IMPRESSION  Patient's evaluation is consistent with her reported overdose and appears to have at least some mild to moderate degree of central nervous system depression as a result.  She is able to wake up and appears to be maintaining her airway.  She is not requiring supplemental oxygen.  She is mildly bradycardic which could be related to the medication but is normotensive.  Unfortunately, she does not know the dosage or the amount she took of any of the medications.  Will plan on doing a talk screen including acetaminophen and salicylate and watching her closely for neurologic deterioration.  Once diagnostics have returned we will contact the Poison Control Center for further advice about an observation period    ED observation? No    LABS  Results for orders placed or performed during the hospital encounter of 06/13/24   HCG Qual Serum   Result Value Ref Range    Beta-Hcg Qualitative Serum Negative Negative   Blood Alcohol   Result Value Ref Range    Diagnostic Alcohol <10.1 <10.1 mg/dL   CBC WITH DIFFERENTIAL   Result Value Ref Range    WBC 12.8 (H) 4.8 - 10.8 K/uL    RBC 5.02 4.20 - 5.40 M/uL    Hemoglobin 14.2 12.0 - 16.0 g/dL    Hematocrit 41.9 37.0 - 47.0 %    MCV 83.5 81.4 - 97.8 fL    MCH 28.3 27.0 - 33.0 pg    MCHC 33.9 32.2 - 35.5 g/dL    RDW 44.6 35.9 - 50.0 fL    Platelet Count 293 164 - 446 K/uL    MPV 9.2 9.0 - 12.9 fL    Neutrophils-Polys 66.80 44.00 - 72.00 %    Lymphocytes 23.40 22.00 - 41.00 %    Monocytes 7.20 0.00 - 13.40 %    Eosinophils 1.90 0.00 - 6.90 %    Basophils 0.40 0.00 - 1.80 %    Immature Granulocytes 0.30 0.00 - 0.90 %    Nucleated RBC 0.00 0.00 - 0.20 /100 WBC    Neutrophils (Absolute) 8.56 (H) 1.82 - 7.42 K/uL    Lymphs (Absolute) 3.00 1.00 - 4.80 K/uL    Monos (Absolute) 0.93 (H) 0.00 - 0.85 K/uL    Eos (Absolute) 0.25 0.00 -  0.51 K/uL    Baso (Absolute) 0.05 0.00 - 0.12 K/uL    Immature Granulocytes (abs) 0.04 0.00 - 0.11 K/uL    NRBC (Absolute) 0.00 K/uL   COMP METABOLIC PANEL   Result Value Ref Range    Sodium 137 135 - 145 mmol/L    Potassium 4.0 3.6 - 5.5 mmol/L    Chloride 104 96 - 112 mmol/L    Co2 21 20 - 33 mmol/L    Anion Gap 12.0 7.0 - 16.0    Glucose 100 (H) 65 - 99 mg/dL    Bun 6 (L) 8 - 22 mg/dL    Creatinine 0.55 0.50 - 1.40 mg/dL    Calcium 9.0 8.5 - 10.5 mg/dL    Correct Calcium 9.2 8.5 - 10.5 mg/dL    AST(SGOT) 17 12 - 45 U/L    ALT(SGPT) 14 2 - 50 U/L    Alkaline Phosphatase 69 30 - 99 U/L    Total Bilirubin 0.3 0.1 - 1.5 mg/dL    Albumin 3.8 3.2 - 4.9 g/dL    Total Protein 7.3 6.0 - 8.2 g/dL    Globulin 3.5 1.9 - 3.5 g/dL    A-G Ratio 1.1 g/dL   Acetaminophen Level   Result Value Ref Range    Acetaminophen -Tylenol <5.0 (L) 10.0 - 30.0 ug/mL   SALICYLATE LEVEL   Result Value Ref Range    Salicylates, Quant. <1.0 (L) 15.0 - 25.0 mg/dL   ESTIMATED GFR   Result Value Ref Range    GFR (CKD-EPI) 116 >60 mL/min/1.73 m 2   POC BREATHALIZER   Result Value Ref Range    POC Breathalizer 0.00 0.00 - 0.01 Percent   EKG (NOW)   Result Value Ref Range    Report       [facility] Emergency Dept.    Test Date:  2024  Pt Name:    SHANI BEARDEN              Department: ER  MRN:        [id number]                      Room:       RD 09  Gender:     Female                       Technician: [id number]  :        1980                   Requested By:WENDY CASTORENA  Order #:    [id number]                    Reading MD:    Measurements  Intervals                                Axis  Rate:       58                           P:          10  NY:         133                          QRS:        36  QRSD:       85                           T:          36  QT:         432  QTc:        425    Interpretive Statements  Sinus bradycardia  Baseline wander in lead(s) V5  Compared to ECG 10/05/2023 13:29:46  No significant  changes       I have independently interpreted this EKG  Sinus bradycardia rate of 58, intervals appear to be within normal limits, there are no convincing ST or T wave changes to suggest anemia, there is no ectopy.  There are no convincing changes when compared to EKG performed in October 2023.  I have independently interpreted the diagnostic imaging associated with this visit and am waiting the final reading from the radiologist.         Case discussed with Stephany from poison control.  Case #[id number].  Chief concern is the tizanidine which has active metabolites up to 24 hours.  Clonidine can also cause decreased LOC and decreased blood pressure as well as the same respiratory rate but it is the half-life of tizanidine that prompts a 24-hour from ingestion telemetry observation.  They recommended observation till around 1130 tomorrow.    Critical Care Note  Upon my evaluation, this patient had high probability of imminent and life-threatening deterioration due to polysubstance overdose with the intention to commit suicide, resulting in toxic encephalopathy, which required my direct attention, intervention, and personal management. I personally provided 35 minutes of critical care time exclusive of time spent on separately billable procedures. Time includes review of laboratory data, radiology results, discussion with consultants, and monitoring for potential decompensation.      ASSESSMENT, COURSE AND PLAN  Care Narrative: Patient's diagnostics returned reassuring however the patient will require an extended period of observation due to active metabolites from tizanidine.  She remained very groggy throughout her stay but did not experience any deterioration otherwise.  Although she was mildly bradycardic, she did not experience any significant hypotension while in the emergency department.  Unfortunately, the patient will need to be admitted for further observation as she will not be medically cleared till around  1130 tomorrow morning.  Patient was discussed with the hospitalist who will evaluate the patient in the emergency department for observation.  Hydration: Based on the patient's presentation of Inability to take oral fluids the patient was given IV fluids. IV Hydration was used because oral hydration was not adequate alone. Upon recheck following hydration, the patient was unchanged.  Overdose: Ms. Glass was here with a suspected overdose of Unknown substance or drug; she has no other known overdoses.            ADDITIONAL PROBLEMS MANAGED      I have discussed management of the patient with the following physicians and SERG's: Hospitalist, poison control    Escalation of care considered, and ultimately not performed: None    Barriers to care at this time, including but not limited to: Toxic encephalopathy.     Decision tools and Rx drugs considered including, but not limited to : None    Discussion of management with other Q or appropriate source(s): None      FINAL IMPRESSION  1. Intentional drug overdose, initial encounter (HCC)    2. Suicidal ideation    3. Toxic encephalopathy        Electronically signed by: Brooks Hunter M.D., 6/13/2024 1:45 PM

## 2024-06-13 NOTE — ED NOTES
Sitter monitoring patient outside pt room. Pt changed into tear away top/bottom, given non skid socks, and given hospital underwear. Pt belongings removed from room. Pt ambulated without assistance with steady gait to restroom with this RN monitoring. Report to Sitter.

## 2024-06-14 LAB
ALBUMIN SERPL BCP-MCNC: 3.6 G/DL (ref 3.2–4.9)
ALBUMIN/GLOB SERPL: 1.2 G/DL
ALP SERPL-CCNC: 59 U/L (ref 30–99)
ALT SERPL-CCNC: 12 U/L (ref 2–50)
AMPHET UR QL SCN: NEGATIVE
ANION GAP SERPL CALC-SCNC: 11 MMOL/L (ref 7–16)
AST SERPL-CCNC: 14 U/L (ref 12–45)
BARBITURATES UR QL SCN: NEGATIVE
BENZODIAZ UR QL SCN: NEGATIVE
BILIRUB SERPL-MCNC: 0.4 MG/DL (ref 0.1–1.5)
BUN SERPL-MCNC: 5 MG/DL (ref 8–22)
BZE UR QL SCN: NEGATIVE
CALCIUM ALBUM COR SERPL-MCNC: 8.9 MG/DL (ref 8.5–10.5)
CALCIUM SERPL-MCNC: 8.6 MG/DL (ref 8.5–10.5)
CANNABINOIDS UR QL SCN: NEGATIVE
CHLORIDE SERPL-SCNC: 107 MMOL/L (ref 96–112)
CO2 SERPL-SCNC: 20 MMOL/L (ref 20–33)
CREAT SERPL-MCNC: 0.41 MG/DL (ref 0.5–1.4)
EKG IMPRESSION: NORMAL
ERYTHROCYTE [DISTWIDTH] IN BLOOD BY AUTOMATED COUNT: 45.2 FL (ref 35.9–50)
EXTRA TUBE BLU BLU: NORMAL
FENTANYL UR QL: NEGATIVE
GFR SERPLBLD CREATININE-BSD FMLA CKD-EPI: 124 ML/MIN/1.73 M 2
GLOBULIN SER CALC-MCNC: 2.9 G/DL (ref 1.9–3.5)
GLUCOSE SERPL-MCNC: 108 MG/DL (ref 65–99)
HCT VFR BLD AUTO: 41.2 % (ref 37–47)
HGB BLD-MCNC: 13.1 G/DL (ref 12–16)
MAGNESIUM SERPL-MCNC: 2 MG/DL (ref 1.5–2.5)
MCH RBC QN AUTO: 27.2 PG (ref 27–33)
MCHC RBC AUTO-ENTMCNC: 31.8 G/DL (ref 32.2–35.5)
MCV RBC AUTO: 85.7 FL (ref 81.4–97.8)
METHADONE UR QL SCN: NEGATIVE
OPIATES UR QL SCN: NEGATIVE
OXYCODONE UR QL SCN: NEGATIVE
PCP UR QL SCN: NEGATIVE
PLATELET # BLD AUTO: 266 K/UL (ref 164–446)
PMV BLD AUTO: 9.4 FL (ref 9–12.9)
POTASSIUM SERPL-SCNC: 3.9 MMOL/L (ref 3.6–5.5)
PROPOXYPH UR QL SCN: NEGATIVE
PROT SERPL-MCNC: 6.5 G/DL (ref 6–8.2)
RBC # BLD AUTO: 4.81 M/UL (ref 4.2–5.4)
SODIUM SERPL-SCNC: 138 MMOL/L (ref 135–145)
WBC # BLD AUTO: 11.9 K/UL (ref 4.8–10.8)

## 2024-06-14 PROCEDURE — 96372 THER/PROPH/DIAG INJ SC/IM: CPT

## 2024-06-14 PROCEDURE — A9270 NON-COVERED ITEM OR SERVICE: HCPCS | Performed by: HOSPITALIST

## 2024-06-14 PROCEDURE — 80307 DRUG TEST PRSMV CHEM ANLYZR: CPT

## 2024-06-14 PROCEDURE — 770020 HCHG ROOM/CARE - TELE (206)

## 2024-06-14 PROCEDURE — 85027 COMPLETE CBC AUTOMATED: CPT

## 2024-06-14 PROCEDURE — 93010 ELECTROCARDIOGRAM REPORT: CPT | Performed by: INTERNAL MEDICINE

## 2024-06-14 PROCEDURE — 83735 ASSAY OF MAGNESIUM: CPT

## 2024-06-14 PROCEDURE — 700102 HCHG RX REV CODE 250 W/ 637 OVERRIDE(OP): Mod: UD

## 2024-06-14 PROCEDURE — 36415 COLL VENOUS BLD VENIPUNCTURE: CPT

## 2024-06-14 PROCEDURE — 99254 IP/OBS CNSLTJ NEW/EST MOD 60: CPT | Performed by: STUDENT IN AN ORGANIZED HEALTH CARE EDUCATION/TRAINING PROGRAM

## 2024-06-14 PROCEDURE — A9270 NON-COVERED ITEM OR SERVICE: HCPCS | Mod: UD

## 2024-06-14 PROCEDURE — 80053 COMPREHEN METABOLIC PANEL: CPT

## 2024-06-14 PROCEDURE — 700102 HCHG RX REV CODE 250 W/ 637 OVERRIDE(OP): Performed by: HOSPITALIST

## 2024-06-14 PROCEDURE — 700102 HCHG RX REV CODE 250 W/ 637 OVERRIDE(OP): Mod: UD | Performed by: HOSPITALIST

## 2024-06-14 PROCEDURE — 700111 HCHG RX REV CODE 636 W/ 250 OVERRIDE (IP): Mod: UD | Performed by: HOSPITALIST

## 2024-06-14 PROCEDURE — A9270 NON-COVERED ITEM OR SERVICE: HCPCS | Mod: UD | Performed by: HOSPITALIST

## 2024-06-14 PROCEDURE — 99232 SBSQ HOSP IP/OBS MODERATE 35: CPT | Performed by: HOSPITALIST

## 2024-06-14 PROCEDURE — 700101 HCHG RX REV CODE 250

## 2024-06-14 RX ADMIN — NICOTINE TRANSDERMAL SYSTEM 21 MG: 21 PATCH, EXTENDED RELEASE TRANSDERMAL at 09:38

## 2024-06-14 RX ADMIN — SERTRALINE 50 MG: 50 TABLET, FILM COATED ORAL at 18:01

## 2024-06-14 RX ADMIN — SENNOSIDES AND DOCUSATE SODIUM 2 TABLET: 50; 8.6 TABLET ORAL at 05:32

## 2024-06-14 RX ADMIN — HEPARIN SODIUM 5000 UNITS: 5000 INJECTION, SOLUTION INTRAVENOUS; SUBCUTANEOUS at 05:32

## 2024-06-14 RX ADMIN — SERTRALINE 100 MG: 50 TABLET, FILM COATED ORAL at 18:00

## 2024-06-14 RX ADMIN — NICOTINE POLACRILEX 2 MG: 2 GUM, CHEWING BUCCAL at 21:11

## 2024-06-14 RX ADMIN — LIDOCAINE 1 PATCH: 4 PATCH TOPICAL at 21:11

## 2024-06-14 ASSESSMENT — PAIN DESCRIPTION - PAIN TYPE
TYPE: ACUTE PAIN
TYPE: ACUTE PAIN
TYPE: CHRONIC PAIN

## 2024-06-14 ASSESSMENT — ENCOUNTER SYMPTOMS
DIZZINESS: 0
PHOTOPHOBIA: 0
SPEECH CHANGE: 0
TREMORS: 0
MYALGIAS: 0
WEIGHT LOSS: 0
COUGH: 0
PALPITATIONS: 0
FEVER: 0
NECK PAIN: 0
CLAUDICATION: 0
BLURRED VISION: 0
DOUBLE VISION: 0
HEARTBURN: 0
VOMITING: 0
CHILLS: 0

## 2024-06-14 NOTE — PROGRESS NOTES
Hospital Medicine Daily Progress Note    Date of Service  6/14/2024    Chief Complaint  Edith Glass is a 43 y.o. female admitted 6/13/2024 with suicide attempt    Hospital Course  Edith Glass is a 43 y.o. female with a past medical history of depression, prior history of suicide attempts who presented 6/13/2024 with attempted suicide.  Apparently the patient took handful of clonidine, gabapentin and tizanidine and attempts to end her life because of her depression.  The patient was brought by [facility] Department as a legal hold.  I evaluated the patient in the emergency room.  She has generalized weakness but denies having pain.  She does feel depressed and still having suicidal thoughts.     Interval Problem Update  Denies suicidal ideation at this time    States that she rested well last night    Patient is currently on a legal hold    Patient had transient bradycardia while she was sleeping    Pending psychiatry input    I have discussed this patient's plan of care and discharge plan at IDT rounds today with Case Management, Nursing, Nursing leadership, and other members of the IDT team.    Consultants/Specialty      Code Status  Full Code    Disposition  The patient is not medically cleared for discharge to home or a post-acute facility.  Anticipate discharge to: home with close outpatient follow-up    I have placed the appropriate orders for post-discharge needs.    Review of Systems  Review of Systems   Constitutional:  Negative for chills, fever, malaise/fatigue and weight loss.   HENT:  Negative for ear discharge, ear pain, hearing loss and tinnitus.    Eyes:  Negative for blurred vision, double vision and photophobia.   Respiratory:  Negative for cough.    Cardiovascular:  Negative for chest pain, palpitations and claudication.   Gastrointestinal:  Negative for heartburn and vomiting.   Genitourinary:  Negative for dysuria, frequency and urgency.   Musculoskeletal:  Negative for  myalgias and neck pain.   Neurological:  Negative for dizziness, tremors and speech change.        Physical Exam  Temp:  [36.1 °C (97 °F)-36.9 °C (98.4 °F)] 36.6 °C (97.9 °F)  Pulse:  [40-68] 40  Resp:  [14-24] 14  BP: ()/(50-60) 97/50  SpO2:  [92 %-97 %] 97 %    Physical Exam  Constitutional:       General: She is not in acute distress.     Appearance: She is not ill-appearing or diaphoretic.   Eyes:      General: No scleral icterus.        Left eye: No discharge.      Extraocular Movements: Extraocular movements intact.      Pupils: Pupils are equal, round, and reactive to light.   Cardiovascular:      Rate and Rhythm: Normal rate and regular rhythm.      Heart sounds: No murmur heard.     No friction rub. No gallop.   Pulmonary:      Effort: No respiratory distress.      Breath sounds: No stridor. No rhonchi or rales.   Abdominal:      General: There is no distension.      Palpations: There is no mass.      Tenderness: There is no abdominal tenderness. There is no guarding or rebound.      Hernia: No hernia is present.   Musculoskeletal:         General: No swelling, tenderness, deformity or signs of injury. Normal range of motion.      Cervical back: Normal range of motion.   Skin:     Capillary Refill: Capillary refill takes less than 2 seconds.      Coloration: Skin is not jaundiced.      Findings: No bruising.   Neurological:      General: No focal deficit present.      Mental Status: She is oriented to person, place, and time.      Cranial Nerves: No cranial nerve deficit.      Motor: No weakness.      Gait: Gait normal.         Fluids    Intake/Output Summary (Last 24 hours) at 6/14/2024 1044  Last data filed at 6/14/2024 0105  Gross per 24 hour   Intake --   Output 250 ml   Net -250 ml       Laboratory  Recent Labs     06/12/24  1318 06/13/24  1359 06/14/24  0053   WBC 11.2* 12.8* 11.9*   RBC 5.70* 5.02 4.81   HEMOGLOBIN 15.8 14.2 13.1   HEMATOCRIT 48.0* 41.9 41.2   MCV 84.2 83.5 85.7   MCH 27.7 28.3  27.2   MCHC 32.9 33.9 31.8*   RDW 44.9 44.6 45.2   PLATELETCT 355 293 266   MPV 10.2 9.2 9.4     Recent Labs     06/12/24  1318 06/13/24  1359 06/14/24  0053   SODIUM 139 137 138   POTASSIUM 3.9 4.0 3.9   CHLORIDE 103 104 107   CO2 22 21 20   GLUCOSE 103* 100* 108*   BUN 8 6* 5*   CREATININE 0.50 0.55 0.41*   CALCIUM 10.1 9.0 8.6                   Imaging  No orders to display        Assessment/Plan  * Overdose of cardiac medication, intentional self-harm, initial encounter (HCC)- (present on admission)  Assessment & Plan  Ms. Glass was here with a confirmed overdose of gabapentin, tizanidine and clonidine; she has no other known overdoses.  This was intentional for suicide.  Patient is currently on legal hold.  Case discussed with poison control who recommended 24-hour observation on telemetry to monitor for hypotension and respiratory depression    Depression- (present on admission)  Assessment & Plan  Just attempted suicide with drug overdose.  Started on legal hold.  IP consult to mental health    Suicide attempt ([facility])- (present on admission)  Assessment & Plan  Ms. Glass was here with a confirmed overdose of gabapentin, tizanidine and clonidine; she has no other known overdoses.  This was intentional for suicide.  Patient is currently on legal hold.  Started by [employer]    IP consult to [facility].  The patient will likely need admission to [facility] hospital when medically cleared    Bradycardia- (present on admission)  Assessment & Plan  Being admitted for confirmed overdose of gabapentin, tizanidine and clonidine     on telemetry  Case discussed with poison control who recommended 24-hour observation on telemetry to monitor for hypotension and respiratory depression  EKG shows sinus bradycardia with a rate of 58.  There is no ST elevation.  There is flattening of T waves in lead III.  QTc is 425     Dehydration- (present on admission)  Assessment & Plan  Likely secondary to  reduced oral intake   Encourage oral intake as tolerated, antiemetics as needed.  Intravenous hydration until adequate oral intake is achieved.          VTE prophylaxis:   SCDs/TEDs      I have performed a physical exam and reviewed and updated ROS and Plan today (6/14/2024). In review of yesterday's note (6/13/2024), there are no changes except as documented above.    Greater than 40 minutes spent prepping to see patient (e.g. review of tests) obtaining and/or reviewing separately obtained history. Performing a medically appropriate examination and/ evaluation.  Counseling and educating the patient/family/caregiver.  Ordering medications, tests, or procedures.  Referring and communicating with other health care professionals.  Documenting clinical information in EPIC.  Independently interpreting results and communicating results to patient/family/caregiver.  Care coordination.

## 2024-06-14 NOTE — HOSPITAL COURSE
This is a 43-year-old female with past medical history of depression, prior history of suicide attempts who was admitted on 6/13/2024 after a suicide attempt.    Patient consumed multiple tablets of clonidine, gabapentin and tizanidine in an attempt to end her life.  Patient was placed on a legal hold by [place] [employer].  Case was discussed with poison control, recommended 24 observation on telemetry to monitor for any respiratory depression or hypotension.  Psychiatry consulted, legal hold extended, patient started on Zoloft.    Patient cleared by psychiatry to resume all home medications, legal hold discontinued.  Patient will follow-up with psychiatry outpatient.

## 2024-06-14 NOTE — CARE PLAN
The patient is Watcher - Medium risk of patient condition declining or worsening    Shift Goals  Clinical Goals: Legal Hold, Psychiatry Consult, Monitor VS  Patient Goals: Pain control, Feel better  Family Goals: MANUEL    Progress made toward(s) clinical / shift goals:      Problem: Knowledge Deficit - Standard  Goal: Patient and family/care givers will demonstrate understanding of plan of care, disease process/condition, diagnostic tests and medications  Description: Target End Date:  1-3 days or as soon as patient condition allows    Document in Patient Education    1.  Patient and family/caregiver oriented to unit, equipment, visitation policy and means for communicating concern  2.  Complete/review Learning Assessment  3.  Assess knowledge level of disease process/condition, treatment plan, diagnostic tests and medications  4.  Explain disease process/condition, treatment plan, diagnostic tests and medications  Outcome: Progressing     Problem: Provide Safe Environment  Goal: Suicide environmental safety, protocols, policies, and practices will be implemented  Description: Target End Date:  resolve day 1    1.  Remove objects or personal belongings that may cause harm or injury to self or others  2.  Dietary tray modifications (paperware)  3.  Provide a safe environment  4.  Render close patient supervision by sustaining observation or awareness of the patient at all times  Outcome: Progressing     Problem: Psychosocial  Goal: Patient's ability to identify and develop effective coping behaviors will improve  Description: Target End Date:  1 to 3 days    1.  Present opportunities for the patient to express thoughts, and feelings in a nonjudgmental environment  2.  Help the patient with problem-solving in a constructive manner.  3.  Educate the patient on cognitive-behavioral self-management responses to suicidal thoughts.  4.  Introduce the use of self-expression methods to manage suicidal feelings  5.  Provide  emotional support  6.  Encourage identification of positive aspects of self  Outcome: Progressing  Goal: Patient's ability to identify and utilize available support systems will improve  Description: Target End Date:  1 to 3 days    1.  Help patient identify available resources and support systems  2.  Collaborate with interdisciplinary team  3.  Collaborate with patient, family/caregiver and other support systems  Outcome: Progressing     Problem: Fall Risk  Goal: Patient will remain free from falls  Description: Target End Date:  Prior to discharge or change in level of care    Document interventions on the [facility] Fall Risk Assessment    1.  Assess for fall risk factors  2.  Implement fall precautions  Outcome: Progressing     Problem: Depression  Goal: Patient and family/caregiver will verbalize accurate information about at least two of the possible causes of depression, three-four of the signs and symptoms of depression  Description: Target End Date:  1 to 3 days    1.  Assess the patient's and family/caregiver's knowledge regarding depression and its causes.  2.  Explain to the patient and family/caregiver regarding the major symptoms of depression.  3.  Inform the patient and family/caregiver that depression can be treated through medications and psychotherapy.  4.  Allow the patient to express feelings and perceptions  5.  Express hope to the patient with realistic comments about the patient's strengths and resources.  5.  Give positive feedback after a tasks are achieved.  6.  Encourage identification of positive aspects of self.  7.  Educate the patient about crisis intervention services such as suicide hotlines and other resources.  Outcome: Progressing     Problem: Pain - Standard  Goal: Alleviation of pain or a reduction in pain to the patient’s comfort goal  Description: Target End Date:  Prior to discharge or change in level of care    Document on Vitals flowsheet    1.  Document pain using the  appropriate pain scale per order or unit policy  2.  Educate and implement non-pharmacologic comfort measures (i.e. relaxation, distraction, massage, cold/heat therapy, etc.)  3.  Pain management medications as ordered  4.  Reassess pain after pain med administration per policy  5.  If opiods administered assess patient's response to pain medication is appropriate per POSS sedation scale  6.  Follow pain management plan developed in collaboration with patient and interdisciplinary team (including palliative care or pain specialists if applicable)  Outcome: Progressing

## 2024-06-14 NOTE — ASSESSMENT & PLAN NOTE
Being admitted for confirmed overdose of gabapentin, tizanidine and clonidine     Telemetry monitoring   Case discussed with poison control who recommended 24-hour observation on telemetry to monitor for hypotension and respiratory depression  EKG shows sinus bradycardia with a rate of 58.  There is no ST elevation.  There is flattening of T waves in lead III.  QTc is 425

## 2024-06-14 NOTE — PROGRESS NOTES
Contacted pt daughter Yolie Glass to provide an update per the pt's request. All questions answered. Provided daughter with contact information if follow up required.

## 2024-06-14 NOTE — DISCHARGE PLANNING
Updated by DR. Sydni Ramon that patient unsure is 12yr old daughter has a safe place to stay. Currently believes daughter is with friends. Daughter has  Nichol with [facility]. Patient does not know nichol contact 3 without phone. Called and LM with [facility] probation Nichol @ [phone], Called and spoke with patients other daughter Adwoa and she states Ashleigh her younger sister is staying with friend Ifeoma and Sampson mom's number is [phone] and name Rosalina. Called Rosalina's number but mailbox is full. Received call from Nichol PO officer and she verified that Ashleigh is staying with Nelli. Nichol # [phone]. Nichol PO office is fine with Ashleigh patients daughter staying with friends. Patient updated that daughter in safe and Nichol PO officer is good with the arrangements. Vane TROY and DR. Sydni Ramon updated.

## 2024-06-14 NOTE — CONSULTS
"PSYCHIATRIC INTAKE EVALUATION (new)  Reason for admission: SA via OD  Reason for consult: as above  Requesting Provider: Marta Mcallister M.D.   Legal Hold Status: initiated, extended     Chart reviewed.         *HPI:       44yo female with history of depression, anxiety, BPD BIB PD yesterday after reportedly taking \"a couple handfuls\" of home clonidine, gabapentin, tizanidine with intent to end life because \"feel like a failure as a mother\". UDS & etoh were negative in ED. Admitted to medical floor for observation per recs of poison control.    On interview today she states \"I feel stupid\" for OD. States she did not feel suicidal per se but felt \"overwhelmed\", \"I was in a bad spot\". \"Relationship is shit\" with live-in boyfriend, looking for new place to live but challenged by recent reduction in work hours. States daughter is struggling with her own issues. Had argument with other daughter about their car. \"It happened so quickly\", denies planning suicide or self-harm, \"I was overwhelmed and it was an emotional act\". 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 States she has been feeling depressed and anxious recently, has struggled with these for long-time but worse because of situation she is in with boyfriend and financial stress. Discussed her circumstances in context of BPD diagnosis of which she is well-informed. Feels hopeful about the future, wants to continue to work with therapist, cites family as protective. Agreeable to team to talk to daughter Yolie.    Collateral call to daughter:  States prior to OD mother was \"stressed but hopeful\", did not give indication of suicidality. Happened 10 min after they had argument on phone about her car, mom hung up phone, then texted something about \"leaving\", followed up with \"I love you, Reynaldo called 911, Im sorry\". Daughter's primary concern " "is mom going back to live with boyfriend Davey due to tumultous relationship. Phone number for sister Reynaldo is [phone].    Psychiatric ROS:  Depression: +\"situational\" sadness, -anhedonia, +\"sometimes\" guilt, +fatigue, +\"sometimes\" difficulty eating, +difficulty concentrating, -recent SI prior to episode, +sleep difficulty  Payton: denies sx consistent with payton  Anxiety: +excess worry, +muscle tension, +irritability, +fatigue, +sleep difficulty, +poor concentration  Psychosis:  denies AVH, paranoia, delusions    Row Name 06/13/24 1947       PHQ - Over the last 2 weeks how often have you been bothered by the following problems?   Little interest or pleasure in doing things Nearly every day       Feeling down, depressed, or hopeless Nearly every day       PHQ-2 Total Score 6       Trouble falling or staying asleep, or sleeping too much Not at all       Feeling tired or having little energy Several days       Poor appetite or overeating More than half the days       Feeling bad about yourself... or that you are a failure or have let yourself or your family down Nearly every day       Trouble concentrating on things, such as reading the newspaper or watching television More than half the days       Moving or speaking so slowly that other people could have noticed. Or the opposite -- being so fidgety or restless that you have been moving around a lot more than usual Not at all       Thoughts that you would be better off dead, or of hurting yourself Several days       PHQ9 Total Score 15 Abnormal        [place] Suicide Severity Rating Scale (C-SSRS Short Version)    Row Name 06/13/24 1951       [place] Suicide Severity Rating Scale   1. Wish to be Dead Yes       2. Suicidal Thoughts Yes       3. Suicidal Thoughts with Method Without Specific Plan or Intent to Act Yes       4. Suicidal Intent Without Specific Plan No       5. Suicide Intent with Specific Plan No       6. Suicide Behavior Question Yes       How long " "ago did you do any of these? Within the last three months       C-SSRS Risk Level High Risk       Additional Suicide Screening Questions   Suspected or Confirmed Suicide Attempt? Yes       Harming or killing others?            Medical ROS (as pertinent):     Review of Systems   Constitutional: Negative.    HENT: Negative.     Eyes: Negative.    Respiratory: Negative.     Cardiovascular: Negative.    Gastrointestinal: Negative.    Genitourinary: Negative.    Musculoskeletal: Negative.    Neurological: Negative.            *Psychiatric Examination:  Vitals:   Vitals:    24 1800   BP: 96/51   Pulse: 94   Resp: 20   Temp: 36 °C (96.8 °F)   SpO2:          General Appearance: Appears state age, appropriate grooming & hygiene, tattoos &  hair, no acute distress, sitting in bed  Behavior:    -Appropriate activity, appropriate eye contact, pleasant & cooperative  Neuro:   -No tremors, tics, dyskinesias or dystonias   -No psychomotor agitation or retardation   -No posture or gait abnormalities appreciated  Speech: Appropriate quantity, spontaneous. Regular rate and rhythm. Appropriate volume and intonation. Articulation is clear  Language: English  Thought processes: Coherent, linear, logical and goal-directed. No evidence of loose associations  Thought content: No overt evidence of current SI/HI/AVH. Not observed responding to internal stimuli. No evidence of delusions or paranoia.   Mood: \"better\" as stated by patient  Affect: Congruent with stated mood. Full range, euthymic, appropriately reactive to conversation. No evidence of lability, abril, or agitation  Judgement and Insight: Fair/Fair  Cognition:    -Alert & oriented x 3 (Person, place and time)   -Attention & concentration grossly intact   -Immediate/delayed memory not formally tested but grossly intact   -Age-appropriate fund of knowledge      Past Medical History:   Past Medical History:   Diagnosis Date    Adverse effect of anesthesia     hypotension " "with colonscopy    Heart burn     Indigestion     Psychiatric disorder depression    Sleep apnea     no CPAP      Denies neuro/thyroid/cardiac hx. Denies known allergies.    Past Psychiatric History:  Previous diagnosis: BPD, depression, anxiety; dx of bipolar as teenager  Current meds:  -sertraline 150mg daily x 1.5yrs  Previous med trials:  -bupropion, helped  Hospitalizations: multiple as teenager, most recent at age 18  Suicide attempts/SIB:   -multiple suicide attempts as teenager  -most recent cutting 10 yrs ago  Outpatient services: sees PCP for psych meds, has outpatient therapist  Access to guns: bin has guns, locked storage  Legal hx: denies    Family Hx:   Sister bipolar, brother depression, many siblings have anxiety. Older sister attempted suicide when young. Multiple siblings struggled with etoh but all sober now.    Social Hx:   Born [place], now living in [place]. \"Happy\" childhood, teenage years difficult. Got GED. 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 7 siblings who are all doing well, good supports. 3 daughters, one 11 yo and two are adults.    Substances:  Smokes cigarettes, trying to quit. Sober from etoh x 1.5yrs, prior was binge drinker. Sober from drugs (meth, marijuana) x 20yrs.    Current Medications:  Current Facility-Administered Medications   Medication Dose Route Frequency Provider Last Rate Last Admin    acetaminophen (Tylenol) tablet 650 mg  650 mg Oral Q6HRS PRN Marta Mcallister M.D.        senna-docusate (Pericolace Or Senokot S) 8.6-50 MG per tablet 2 Tablet  2 Tablet Oral BID Marta Mcallister M.D.   2 Tablet at 06/14/24 0532    And    polyethylene glycol/lytes (Miralax) Packet 1 Packet  1 Packet Oral QDAY PRN Marta Mcallister M.D.        Respiratory Therapy Consult   Nebulization Continuous RT Marta Mcallister M.D.        heparin injection 5,000 Units  5,000 Units Subcutaneous Q8HRS Marta Mcallister M.D.   5,000 Units at 06/14/24 0532    lidocaine (Asperflex) 4 " % patch 1 Patch  1 Patch Transdermal Q24HR Zee Snow, A.P.R.N.   1 Patch at 06/13/24 2044    albuterol inhaler 2 Puff  2 Puff Inhalation Q4HRS PRN Marta Mcallister M.D.        nicotine (Nicoderm) 21 MG/24HR 21 mg  21 mg Transdermal Daily-0600 Zee Snow, A.P.R.N.   21 mg at 06/14/24 0938    And    nicotine polacrilex (Nicorette) 2 MG piece 2 mg  2 mg Oral Q HOUR PRN Zee Snow, A.P.R.N.            Allergies:  Morphine and Nkda [no known drug allergy]     Labs personally reviewed:   Recent Results (from the past 72 hour(s))   HEPATITIS PANEL ACUTE(4 COMPONENTS)    Collection Time: 06/12/24  1:18 PM   Result Value Ref Range    Hepatitis B Surface Antigen Non-Reactive Non-Reactive    Hepatitis B Cors Ab,IgM Non-Reactive Non-Reactive    Hepatitis A Virus Ab, IgM Non-Reactive Non-Reactive    Hepatitis C Antibody Non-Reactive Non-Reactive   LIPASE    Collection Time: 06/12/24  1:18 PM   Result Value Ref Range    Lipase 45 11 - 82 U/L   Comp Metabolic Panel    Collection Time: 06/12/24  1:18 PM   Result Value Ref Range    Sodium 139 135 - 145 mmol/L    Potassium 3.9 3.6 - 5.5 mmol/L    Chloride 103 96 - 112 mmol/L    Co2 22 20 - 33 mmol/L    Anion Gap 14.0 7.0 - 16.0    Glucose 103 (H) 65 - 99 mg/dL    Bun 8 8 - 22 mg/dL    Creatinine 0.50 0.50 - 1.40 mg/dL    Calcium 10.1 8.5 - 10.5 mg/dL    Correct Calcium 9.9 8.5 - 10.5 mg/dL    AST(SGOT) 22 12 - 45 U/L    ALT(SGPT) 20 2 - 50 U/L    Alkaline Phosphatase 75 30 - 99 U/L    Total Bilirubin 0.5 0.1 - 1.5 mg/dL    Albumin 4.3 3.2 - 4.9 g/dL    Total Protein 8.1 6.0 - 8.2 g/dL    Globulin 3.8 (H) 1.9 - 3.5 g/dL    A-G Ratio 1.1 g/dL   CBC WITH DIFFERENTIAL    Collection Time: 06/12/24  1:18 PM   Result Value Ref Range    WBC 11.2 (H) 4.8 - 10.8 K/uL    RBC 5.70 (H) 4.20 - 5.40 M/uL    Hemoglobin 15.8 12.0 - 16.0 g/dL    Hematocrit 48.0 (H) 37.0 - 47.0 %    MCV 84.2 81.4 - 97.8 fL    MCH 27.7 27.0 - 33.0 pg    MCHC 32.9 32.2 - 35.5 g/dL    RDW 44.9  35.9 - 50.0 fL    Platelet Count 355 164 - 446 K/uL    MPV 10.2 9.0 - 12.9 fL    Neutrophils-Polys 58.50 44.00 - 72.00 %    Lymphocytes 30.40 22.00 - 41.00 %    Monocytes 6.70 0.00 - 13.40 %    Eosinophils 3.40 0.00 - 6.90 %    Basophils 0.60 0.00 - 1.80 %    Immature Granulocytes 0.40 0.00 - 0.90 %    Nucleated RBC 0.00 0.00 - 0.20 /100 WBC    Neutrophils (Absolute) 6.54 1.82 - 7.42 K/uL    Lymphs (Absolute) 3.40 1.00 - 4.80 K/uL    Monos (Absolute) 0.75 0.00 - 0.85 K/uL    Eos (Absolute) 0.38 0.00 - 0.51 K/uL    Baso (Absolute) 0.07 0.00 - 0.12 K/uL    Immature Granulocytes (abs) 0.05 0.00 - 0.11 K/uL    NRBC (Absolute) 0.00 K/uL   ESTIMATED GFR    Collection Time: 06/12/24  1:18 PM   Result Value Ref Range    GFR (CKD-EPI) 119 >60 mL/min/1.73 m 2   POC BREATHALIZER    Collection Time: 06/13/24  1:54 PM   Result Value Ref Range    POC Breathalizer 0.00 0.00 - 0.01 Percent   HCG Qual Serum    Collection Time: 06/13/24  1:59 PM   Result Value Ref Range    Beta-Hcg Qualitative Serum Negative Negative   Blood Alcohol    Collection Time: 06/13/24  1:59 PM   Result Value Ref Range    Diagnostic Alcohol <10.1 <10.1 mg/dL   CBC WITH DIFFERENTIAL    Collection Time: 06/13/24  1:59 PM   Result Value Ref Range    WBC 12.8 (H) 4.8 - 10.8 K/uL    RBC 5.02 4.20 - 5.40 M/uL    Hemoglobin 14.2 12.0 - 16.0 g/dL    Hematocrit 41.9 37.0 - 47.0 %    MCV 83.5 81.4 - 97.8 fL    MCH 28.3 27.0 - 33.0 pg    MCHC 33.9 32.2 - 35.5 g/dL    RDW 44.6 35.9 - 50.0 fL    Platelet Count 293 164 - 446 K/uL    MPV 9.2 9.0 - 12.9 fL    Neutrophils-Polys 66.80 44.00 - 72.00 %    Lymphocytes 23.40 22.00 - 41.00 %    Monocytes 7.20 0.00 - 13.40 %    Eosinophils 1.90 0.00 - 6.90 %    Basophils 0.40 0.00 - 1.80 %    Immature Granulocytes 0.30 0.00 - 0.90 %    Nucleated RBC 0.00 0.00 - 0.20 /100 WBC    Neutrophils (Absolute) 8.56 (H) 1.82 - 7.42 K/uL    Lymphs (Absolute) 3.00 1.00 - 4.80 K/uL    Monos (Absolute) 0.93 (H) 0.00 - 0.85 K/uL    Eos (Absolute)  0.25 0.00 - 0.51 K/uL    Baso (Absolute) 0.05 0.00 - 0.12 K/uL    Immature Granulocytes (abs) 0.04 0.00 - 0.11 K/uL    NRBC (Absolute) 0.00 K/uL   COMP METABOLIC PANEL    Collection Time: 24  1:59 PM   Result Value Ref Range    Sodium 137 135 - 145 mmol/L    Potassium 4.0 3.6 - 5.5 mmol/L    Chloride 104 96 - 112 mmol/L    Co2 21 20 - 33 mmol/L    Anion Gap 12.0 7.0 - 16.0    Glucose 100 (H) 65 - 99 mg/dL    Bun 6 (L) 8 - 22 mg/dL    Creatinine 0.55 0.50 - 1.40 mg/dL    Calcium 9.0 8.5 - 10.5 mg/dL    Correct Calcium 9.2 8.5 - 10.5 mg/dL    AST(SGOT) 17 12 - 45 U/L    ALT(SGPT) 14 2 - 50 U/L    Alkaline Phosphatase 69 30 - 99 U/L    Total Bilirubin 0.3 0.1 - 1.5 mg/dL    Albumin 3.8 3.2 - 4.9 g/dL    Total Protein 7.3 6.0 - 8.2 g/dL    Globulin 3.5 1.9 - 3.5 g/dL    A-G Ratio 1.1 g/dL   Acetaminophen Level    Collection Time: 24  1:59 PM   Result Value Ref Range    Acetaminophen -Tylenol <5.0 (L) 10.0 - 30.0 ug/mL   SALICYLATE LEVEL    Collection Time: 24  1:59 PM   Result Value Ref Range    Salicylates, Quant. <1.0 (L) 15.0 - 25.0 mg/dL   ESTIMATED GFR    Collection Time: 24  1:59 PM   Result Value Ref Range    GFR (CKD-EPI) 116 >60 mL/min/1.73 m 2   EKG (NOW)    Collection Time: 24  2:01 PM   Result Value Ref Range    Report       [facility] Emergency Dept.    Test Date:  2024  Pt Name:    SHANI BEARDEN              Department: ER  MRN:        [id number]                      Room:       [facility]  Gender:     Female                       Technician: [id number]  :        1980                   Requested By:WENDY CASTORENA  Order #:    [id number]                    Reading MD: Wendy Castorena MD    Measurements  Intervals                                Axis  Rate:       58                           P:          10  KY:         133                          QRS:        36  QRSD:       85                           T:          36  QT:         432  QTc:         425    Interpretive Statements  Sinus bradycardia  Baseline wander in lead(s) V5  Compared to ECG 10/05/2023 13:29:46  No significant changes  Electronically Signed On 2024 18:49:18 PDT by Brooks Hunter MD     EKG    Collection Time: 24  9:25 PM   Result Value Ref Range    Report       [facility]    Test Date:  2024  Pt Name:    SHANI BEARDEN              Department: [facility]  MRN:        [id number]                      Room:       T209  Gender:     Female                       Technician: ALEJANDRA  :        1980                   Requested By:HAWA WATSON  Order #:    [id number]                    Reading MD:    Measurements  Intervals                                Axis  Rate:       41                           P:          37  AK:         156                          QRS:        42  QRSD:       90                           T:          49  QT:         505  QTc:        418    Interpretive Statements  Sinus bradycardia  Compared to ECG 2024 14:01:55  No significant changes     CBC without Differential    Collection Time: 24 12:53 AM   Result Value Ref Range    WBC 11.9 (H) 4.8 - 10.8 K/uL    RBC 4.81 4.20 - 5.40 M/uL    Hemoglobin 13.1 12.0 - 16.0 g/dL    Hematocrit 41.2 37.0 - 47.0 %    MCV 85.7 81.4 - 97.8 fL    MCH 27.2 27.0 - 33.0 pg    MCHC 31.8 (L) 32.2 - 35.5 g/dL    RDW 45.2 35.9 - 50.0 fL    Platelet Count 266 164 - 446 K/uL    MPV 9.4 9.0 - 12.9 fL   Comp Metabolic Panel (CMP)    Collection Time: 24 12:53 AM   Result Value Ref Range    Sodium 138 135 - 145 mmol/L    Potassium 3.9 3.6 - 5.5 mmol/L    Chloride 107 96 - 112 mmol/L    Co2 20 20 - 33 mmol/L    Anion Gap 11.0 7.0 - 16.0    Glucose 108 (H) 65 - 99 mg/dL    Bun 5 (L) 8 - 22 mg/dL    Creatinine 0.41 (L) 0.50 - 1.40 mg/dL    Calcium 8.6 8.5 - 10.5 mg/dL    Correct Calcium 8.9 8.5 - 10.5 mg/dL    AST(SGOT) 14 12 - 45 U/L    ALT(SGPT) 12 2 - 50 U/L    Alkaline Phosphatase 59 30 - 99 U/L    Total Bilirubin 0.4  0.1 - 1.5 mg/dL    Albumin 3.6 3.2 - 4.9 g/dL    Total Protein 6.5 6.0 - 8.2 g/dL    Globulin 2.9 1.9 - 3.5 g/dL    A-G Ratio 1.2 g/dL   Magnesium    Collection Time: 24 12:53 AM   Result Value Ref Range    Magnesium 2.0 1.5 - 2.5 mg/dL   ESTIMATED GFR    Collection Time: 24 12:53 AM   Result Value Ref Range    GFR (CKD-EPI) 124 >60 mL/min/1.73 m 2   Urine Drug Screen    Collection Time: 24  1:05 AM   Result Value Ref Range    Amphetamines Urine Negative Negative    Barbiturates Negative Negative    Benzodiazepines Negative Negative    Cocaine Metabolite Negative Negative    Fentanyl, Urine Negative Negative    Methadone Negative Negative    Opiates Negative Negative    Oxycodone Negative Negative    Phencyclidine -Pcp Negative Negative    Propoxyphene Negative Negative    Cannabinoid Metab Negative Negative           EKG:   Results for orders placed or performed during the hospital encounter of 24   EKG (NOW)   Result Value Ref Range    Report       [facility] Emergency Dept.    Test Date:  2024  Pt Name:    SHANI BEARDEN              Department: ER  MRN:        [id number]                      Room:       [facility]  Gender:     Female                       Technician: [id number]  :        1980                   Requested By:WENDY CASTORENA  Order #:    [id number]                    Reading MD: Wendy Castorena MD    Measurements  Intervals                                Axis  Rate:       58                           P:          10  TX:         133                          QRS:        36  QRSD:       85                           T:          36  QT:         432  QTc:        425    Interpretive Statements  Sinus bradycardia  Baseline wander in lead(s) V5  Compared to ECG 10/05/2023 13:29:46  No significant changes  Electronically Signed On 2024 18:49:18 PDT by Wendy Castorena MD     EKG   Result Value Ref Range    Report       [facility]    Test Date:   "2024  Pt Name:    SHANI BEARDEN              Department: CPU  MRN:        [id number]                      Room:       T209  Gender:     Female                       Technician: ALEJANDRA  :        1980                   Requested By:HAWA WATSON  Order #:    [id number]                    Reading MD:    Measurements  Intervals                                Axis  Rate:       41                           P:          37  LA:         156                          QRS:        42  QRSD:       90                           T:          49  QT:         505  QTc:        418    Interpretive Statements  Sinus bradycardia  Compared to ECG 2024 14:01:55  No significant changes        Brain Imaging:   MRI brain w & w/o 21  Impression    No no infarct, mass lesion or abnormal intracranial enhancement.    EEG: none      Assessment:  44yo female with history of depression, anxiety, BPD BIB PD yesterday after reportedly taking \"a couple handfuls\" of home clonidine, gabapentin, tizanidine with intent to end life because \"feel like a failure as a mother\". Presentation consistent with impulsive suicidality often seen in BPD due to unstable sense of self, poor coping skills, and interpersonal instability. That said, does appear pt has otherwise been doing relatively well prior to this SA in that she has maintained sobriety, has career and other good family relationships, is involved with outpatient therapy. Unfortunately due to significance of OD, will continue legal hold and continue to work toward safe discharge planning. Otherwise no changes or acute concerns.    Dx:  #Borderline personality disorder  #History of depression, anxiety    Medical:  Per primary team      Plan:  Legal hold: continue 72hr hold, extended  Psychotropic medications  Continue to hold home psychotropics due to OD  Labs/notes/imaging reviewed  Collateral obtained from cheikh Urbano  Safety plan provided to pt  Discussed the case with: Dr." Fermin  Psychiatry will follow up     Thank you for the consult.     Sitter: 1:1  Phone: may have cell phone  Visitors: family can visit  Personal belongings:  may have cell phons

## 2024-06-14 NOTE — PROGRESS NOTES
Assumed care of pt  Sitter at bedside   Room checklist completed  Pt educated on legal hold and consult    Intact

## 2024-06-14 NOTE — ASSESSMENT & PLAN NOTE
Just attempted suicide with drug overdose.  Started on legal hold.  Psychiatry following, started Zoloft

## 2024-06-14 NOTE — PROGRESS NOTES
Notified by monitor tech at 2005 of pt's HR sustaining between 39-41 with minor ST elevations. RR 14, SpO2 95%, BP 91/57. Notified KEVIN Rios at 2007. Orders placed to hold Benadryl at this time. Will continue to monitor VS.

## 2024-06-14 NOTE — PROGRESS NOTES
4 Eyes Skin Assessment Completed by SYDNI Ricardo and MARIFER Mcclure.    Head WDL  Ears WDL  Nose WDL  Mouth WDL  Neck Scar- old surgical scars on the left and right of neck  Breast/Chest WDL  Shoulder Blades WDL  Spine WDL  (R) Arm/Elbow/Hand WDL  (L) Arm/Elbow/Hand WDL  Abdomen Scar- old surgical scars on the abdomen  Groin WDL  Scrotum/Coccyx/Buttocks WDL  (R) Leg WDL  (L) Leg WDL  (R) Heel/Foot/Toe WDL  (L) Heel/Foot/Toe WDL    Devices In Places Tele Box, Pulse Ox    Interventions In Place Pillows    Possible Skin Injury No    Pictures Uploaded Into Epic N/A  Wound Consult Placed N/A  RN Wound Prevention Protocol Ordered No

## 2024-06-14 NOTE — ED NOTES
Bedside report received from off going RN/tech: Becki RN, assumed care of patient.  POC discussed with patient. Call light within reach, all needs addressed at this time.       Fall risk interventions in place: Not Applicable (all applicable per [place] Fall risk assessment)   Continuous monitoring: Cardiac Leads, Pulse Ox, or Blood Pressure  IVF/IV medications: Not Applicable   Oxygen: Room Air  Bedside sitter: Pt on L2k SI with 1:1 sitter   (name), Report given to sitter, checklist completed, and checklist completed, stop sign in doorway  Isolation: Not Applicable

## 2024-06-14 NOTE — ED NOTES
Pt provided with water. Bedside report to Sheng TROY. Pt sitting on edge of bed, bed locked in lowest position, side rail up x 1, pt sitting on edge of bed, pt on cardiac lead, HR, BP, and oxygen monitor. Sitter monitoring pt outside pt room.

## 2024-06-14 NOTE — PROGRESS NOTES
Pt transported to CDU on Cardiac Monitor. Legal Hold protocol in place, pt in paper gown. Sitter accompanied and RN Notified on arrival.

## 2024-06-14 NOTE — ASSESSMENT & PLAN NOTE
Likely secondary to reduced oral intake   Encourage oral intake as tolerated, antiemetics as needed.  Intravenous hydration until adequate oral intake is achieved.

## 2024-06-14 NOTE — ASSESSMENT & PLAN NOTE
Ms. Glass was here with a confirmed overdose of gabapentin, tizanidine and clonidine; she has no other known overdoses.  This was intentional for suicide.  Patient is currently on legal hold.  Case discussed with poison control who recommended 24-hour observation on telemetry to monitor for hypotension and respiratory depression

## 2024-06-14 NOTE — DISCHARGE PLANNING
Called and spoke with Tiffani VALERO. She has no received  paperwork. paperwork re faxed to Tiffani.

## 2024-06-14 NOTE — CARE PLAN
The patient is Stable - Low risk of patient condition declining or worsening    Shift Goals  Clinical Goals: Legal Hold, Psychiatry Consult, Monitor VS  Patient Goals: Pain control, Feel better  Family Goals: MANUEL    Progress made toward(s) clinical / shift goals:    Problem: Knowledge Deficit - Standard  Goal: Patient and family/care givers will demonstrate understanding of plan of care, disease process/condition, diagnostic tests and medications  Description: Target End Date:  1-3 days or as soon as patient condition allows    Document in Patient Education    1.  Patient and family/caregiver oriented to unit, equipment, visitation policy and means for communicating concern  2.  Complete/review Learning Assessment  3.  Assess knowledge level of disease process/condition, treatment plan, diagnostic tests and medications  4.  Explain disease process/condition, treatment plan, diagnostic tests and medications  Outcome: Progressing       Patient is not progressing towards the following goals:

## 2024-06-14 NOTE — PROGRESS NOTES
Assumed pt care. Pt A&O x4 and fatigued; VSS: HR sustaining low 40s. Updated pt on POC and provided education regarding the process of a legal hold. Pt eating dinner on bed and accompanied by a sitter.

## 2024-06-14 NOTE — H&P
Hospital Medicine History & Physical Note    Date of Service  6/13/2024    Primary Care Physician  JJ Charles.     Consultants  Mental health    Code Status  Full Code    Chief Complaint  Chief Complaint   Patient presents with    Suicidal Ideation     Pt BIBA for SI attempt by OD. Pt states she took clonidine, gabapentin, and tizanidine. GCS 15.      History of Presenting Illness  Edith Glass is a 43 y.o. female with a past medical history of depression, prior history of suicide attempts who presented 6/13/2024 with attempted suicide.  Apparently the patient took handful of clonidine, gabapentin and tizanidine and attempts to end her life because of her depression.  The patient was brought by [facility] as a legal hold.  I evaluated the patient in the emergency room.  She has generalized weakness but denies having pain.  She does feel depressed and still having suicidal thoughts.       I discussed the plan of care with emergency physician, and the patient.    Review of Systems  Review of Systems   Constitutional:  Positive for malaise/fatigue. Negative for chills and fever.   Eyes:  Negative for discharge and redness.   Respiratory:  Negative for cough, shortness of breath and stridor.    Cardiovascular:  Negative for chest pain and leg swelling.   Gastrointestinal:  Negative for abdominal pain and vomiting.   Genitourinary:  Negative for flank pain.   Musculoskeletal:  Negative for myalgias.   Skin: Negative.    Neurological:  Negative for focal weakness.   Endo/Heme/Allergies:  Does not bruise/bleed easily.   Psychiatric/Behavioral:  Positive for depression. The patient has insomnia.      Past Medical History   has a past medical history of Adverse effect of anesthesia, Heart burn, Indigestion, Psychiatric disorder (depression), and Sleep apnea.    Surgical History   has a past surgical history that includes other orthopedic surgery (2005); hysterectomy laparoscopy;  cholecystectomy; and cervical disk and fusion anterior (N/A, 10/10/2023).     Family History  family history includes Heart Disease in her mother.      Social History   reports that she has been smoking cigarettes. She has a 15 pack-year smoking history. She has been exposed to tobacco smoke. She has never used smokeless tobacco. She reports that she does not drink alcohol and does not use drugs.    Allergies  Allergies   Allergen Reactions    Morphine     Nkda [No Known Drug Allergy]      Medications  Prior to Admission Medications   Prescriptions Last Dose Informant Patient Reported? Taking?   AJOVY 225 MG/1.5ML Solution Auto-injector  Patient Yes No   Sig: INJECT 1 PEN SUBCUTANEOUSLY ONCE MONTHLY.   NURTEC 75 MG TABLET DISPERSIBLE   Yes No   Sig: TAKE 1 TABLET BY MOUTH AT ONSET OF HEADACHE. OKAY TO REPEAT IN 24 HOURS IF NEEDED.   albuterol 108 (90 BASE) MCG/ACT Aero Soln inhalation aerosol  Patient No No   Sig: Inhale 2 Puffs by mouth every 6 hours as needed for Shortness of Breath.   gabapentin (NEURONTIN) 400 MG Cap   Yes No   Sig: Take 400 mg by mouth 3 times a day.   onabotulinum toxin A (BOTOX) 200 units Recon Soln injection   Yes No   Sig: give 155 units IM per  FDA approved paradigm for treatment of chronic migraine disorder q.12 weeks.   sertraline (ZOLOFT) 100 MG Tab  Patient Yes No   Sig: Take 100 mg by mouth every day. TAKE W/50MG   sertraline (ZOLOFT) 50 MG Tab   Yes No   Sig: TAKE 1 TABLET BY MOUTH ONCE DAILY WITH 100 MG TABLET   tizanidine (ZANAFLEX) 2 MG tablet  Patient No No   Sig: Take 1 Tablet by mouth 3 times a day as needed (neck pain).      Facility-Administered Medications: None     Physical Exam  Temp:  [36.4 °C (97.6 °F)] 36.4 °C (97.6 °F)  Pulse:  [48-68] 60  Resp:  [15-24] 15  BP: ()/(50-60) 101/50  SpO2:  [92 %-96 %] 96 %  Blood Pressure: 106/57   Temperature: 36.4 °C (97.6 °F)   Pulse: (!) 53   Respiration: 16   Pulse Oximetry: 94 %     Physical Exam  Constitutional:        General: She is not in acute distress.  HENT:      Head: Normocephalic and atraumatic.      Right Ear: External ear normal.      Left Ear: External ear normal.      Nose: No congestion or rhinorrhea.      Mouth/Throat:      Mouth: Mucous membranes are moist.      Pharynx: No oropharyngeal exudate or posterior oropharyngeal erythema.   Eyes:      General: No scleral icterus.        Right eye: No discharge.         Left eye: No discharge.      Conjunctiva/sclera: Conjunctivae normal.      Pupils: Pupils are equal, round, and reactive to light.   Cardiovascular:      Rate and Rhythm: Regular rhythm. Bradycardia present.      Heart sounds:      No friction rub. No gallop.   Pulmonary:      Effort: Pulmonary effort is normal.      Comments: Saturating well on room air.  Is able to speak in full sentences  Abdominal:      General: Abdomen is flat. There is no distension.      Tenderness: There is no guarding.   Musculoskeletal:         General: No swelling.      Cervical back: Neck supple. No rigidity. No muscular tenderness.      Right lower leg: No edema.      Left lower leg: No edema.   Skin:     Capillary Refill: Capillary refill takes 2 to 3 seconds.      Coloration: Skin is not jaundiced or pale.      Findings: No bruising or erythema.   Neurological:      Mental Status: She is alert and oriented to person, place, and time.   Psychiatric:      Comments: Depressed mood.  Suicidal ideation       Laboratory:  Recent Labs     06/12/24  1318 06/13/24  1359   WBC 11.2* 12.8*   RBC 5.70* 5.02   HEMOGLOBIN 15.8 14.2   HEMATOCRIT 48.0* 41.9   MCV 84.2 83.5   MCH 27.7 28.3   MCHC 32.9 33.9   RDW 44.9 44.6   PLATELETCT 355 293   MPV 10.2 9.2     Recent Labs     06/12/24  1318 06/13/24  1359   SODIUM 139 137   POTASSIUM 3.9 4.0   CHLORIDE 103 104   CO2 22 21   GLUCOSE 103* 100*   BUN 8 6*   CREATININE 0.50 0.55   CALCIUM 10.1 9.0     Recent Labs     06/12/24  1318 06/13/24  1359   ALTSGPT 20 14   ASTSGOT 22 17   ALKPHOSPHAT 75  "69   TBILIRUBIN 0.5 0.3   LIPASE 45  --    GLUCOSE 103* 100*         No results for input(s): \"NTPROBNP\" in the last 72 hours.      No results for input(s): \"TROPONINT\" in the last 72 hours.    Imaging:  No orders to display     I personally reviewed patient EKG shows sinus bradycardia with a rate of 58.  There is no ST elevation.  There is flattening of T waves in lead III.  QTc is 425    Assessment/Plan:  Justification for Admission Status  I anticipate this patient is appropriate for observation status at this time because the patient is here for a suicide attempt.  Will require 24-hour observation on telemetry.  Likely discharge after 1 midnight.  She will need to be discharged to inpatient mental health facility    Patient will need a Telemetry bed on MEDICAL service, intentional drug overdose    * Overdose of cardiac medication, intentional self-harm, initial encounter (HCC)- (present on admission)  Assessment & Plan  Ms. Glass was here with a confirmed overdose of gabapentin, tizanidine and clonidine; she has no other known overdoses.  This was intentional for suicide.  Patient is currently on legal hold.  Case discussed with poison control who recommended 24-hour observation on telemetry to monitor for hypotension and respiratory depression    Suicide attempt (HCC)- (present on admission)  Assessment & Plan  Ms. Glass was here with a confirmed overdose of gabapentin, tizanidine and clonidine; she has no other known overdoses.  This was intentional for suicide.  Patient is currently on legal hold.  Started by [employer] [place] [employer]  Case discussed with poison control who recommended 24-hour observation on telemetry to monitor for hypotension and respiratory depression  Has a history of prior suicide attempt.  IP consult to mental health placed.  The patient will likely need admission to mental health hospital when medically cleared    Depression- (present on admission)  Assessment & Plan  Just attempted " suicide with drug overdose.  Started on legal hold.  IP consult to mental health    Bradycardia- (present on admission)  Assessment & Plan  Being admitted for confirmed overdose of gabapentin, tizanidine and clonidine   I will check an EKG  Will monitor on telemetry  Case discussed with poison control who recommended 24-hour observation on telemetry to monitor for hypotension and respiratory depression  EKG shows sinus bradycardia with a rate of 58.  There is no ST elevation.  There is flattening of T waves in lead III.  QTc is 425     Dehydration- (present on admission)  Assessment & Plan  Likely secondary to reduced oral intake   Encourage oral intake as tolerated, antiemetics as needed.  Intravenous hydration until adequate oral intake is achieved.       VTE prophylaxis: SCDs/TEDs and heparin ppx

## 2024-06-14 NOTE — ASSESSMENT & PLAN NOTE
Ms. Glass was here with a confirmed overdose of gabapentin, tizanidine and clonidine; she has no other known overdoses.  This was intentional for suicide.  Patient is currently on legal hold.  Started by [employer]    Psychiatry following, started Zoloft

## 2024-06-14 NOTE — PROGRESS NOTES
Monitor summary: SB 41-57, W[place] 0.17, QRS 0.10, QT 0.44, with HR down to 36 per strip from monitor room.

## 2024-06-14 NOTE — ED NOTES
Medication history reviewed with patient at bedside along with some medications stored in central pharmacy.   Med rec is complete  Allergies reviewed.     Patient has not had any outpatient antibiotics in the last 30 days.   Anticoagulants: No    Hayley Prakash

## 2024-06-14 NOTE — CONSULTS
Alert Team:    Pt is not medically cleared and is being admitted to the floor. Psychiatry consult has been ordered.

## 2024-06-15 PROCEDURE — A9270 NON-COVERED ITEM OR SERVICE: HCPCS | Performed by: HOSPITALIST

## 2024-06-15 PROCEDURE — 700102 HCHG RX REV CODE 250 W/ 637 OVERRIDE(OP): Performed by: GENERAL PRACTICE

## 2024-06-15 PROCEDURE — 700102 HCHG RX REV CODE 250 W/ 637 OVERRIDE(OP): Performed by: HOSPITALIST

## 2024-06-15 PROCEDURE — A9270 NON-COVERED ITEM OR SERVICE: HCPCS | Performed by: GENERAL PRACTICE

## 2024-06-15 PROCEDURE — 770001 HCHG ROOM/CARE - MED/SURG/GYN PRIV*

## 2024-06-15 PROCEDURE — 700102 HCHG RX REV CODE 250 W/ 637 OVERRIDE(OP)

## 2024-06-15 PROCEDURE — 99233 SBSQ HOSP IP/OBS HIGH 50: CPT | Performed by: GENERAL PRACTICE

## 2024-06-15 PROCEDURE — A9270 NON-COVERED ITEM OR SERVICE: HCPCS

## 2024-06-15 PROCEDURE — 700101 HCHG RX REV CODE 250

## 2024-06-15 RX ORDER — POLYETHYLENE GLYCOL 3350 17 G/17G
1 POWDER, FOR SOLUTION ORAL
Status: DISCONTINUED | OUTPATIENT
Start: 2024-06-15 | End: 2024-06-16 | Stop reason: HOSPADM

## 2024-06-15 RX ORDER — AMOXICILLIN 250 MG
2 CAPSULE ORAL 2 TIMES DAILY PRN
Status: DISCONTINUED | OUTPATIENT
Start: 2024-06-15 | End: 2024-06-16 | Stop reason: HOSPADM

## 2024-06-15 RX ORDER — UREA 10 %
10 LOTION (ML) TOPICAL NIGHTLY
Status: DISCONTINUED | OUTPATIENT
Start: 2024-06-15 | End: 2024-06-16 | Stop reason: HOSPADM

## 2024-06-15 RX ADMIN — ACETAMINOPHEN 650 MG: 325 TABLET, FILM COATED ORAL at 12:42

## 2024-06-15 RX ADMIN — LIDOCAINE 1 PATCH: 4 PATCH TOPICAL at 20:37

## 2024-06-15 RX ADMIN — NICOTINE TRANSDERMAL SYSTEM 21 MG: 21 PATCH, EXTENDED RELEASE TRANSDERMAL at 05:23

## 2024-06-15 RX ADMIN — SERTRALINE 150 MG: 100 TABLET, FILM COATED ORAL at 05:24

## 2024-06-15 RX ADMIN — ACETAMINOPHEN 650 MG: 325 TABLET, FILM COATED ORAL at 21:26

## 2024-06-15 RX ADMIN — NICOTINE POLACRILEX 2 MG: 2 GUM, CHEWING BUCCAL at 18:13

## 2024-06-15 RX ADMIN — Medication 10 MG: at 20:37

## 2024-06-15 ASSESSMENT — COGNITIVE AND FUNCTIONAL STATUS - GENERAL
MOBILITY SCORE: 24
SUGGESTED CMS G CODE MODIFIER DAILY ACTIVITY: CH
SUGGESTED CMS G CODE MODIFIER MOBILITY: CH
DAILY ACTIVITIY SCORE: 24

## 2024-06-15 ASSESSMENT — PAIN DESCRIPTION - PAIN TYPE
TYPE: CHRONIC PAIN

## 2024-06-15 ASSESSMENT — FIBROSIS 4 INDEX: FIB4 SCORE: 0.65

## 2024-06-15 NOTE — PROGRESS NOTES
Bedside report received from off going RN/tech: SYDNI Cooper, assumed care of patient.     Fall Risk Score: NO RISK  Fall risk interventions in place: Provide patient/family education based on risk assessment  Bed type: Regular (Fabien Score less than 17 interventions in place)  Patient on cardiac monitor: Yes  IVF/IV medications: Not Applicable   Oxygen: Room Air  Bedside sitter: Pt on L2k SI with 1:1 sitter Can (name)  Isolation: Not applicable

## 2024-06-15 NOTE — CARE PLAN
Problem: Knowledge Deficit - Standard  Goal: Patient and family/care givers will demonstrate understanding of plan of care, disease process/condition, diagnostic tests and medications  Outcome: Progressing  Note: Patient is axox 4. Patient has been educated and updated on plan of care.      Problem: Fall Risk  Goal: Patient will remain free from falls  Outcome: Progressing     Problem: Pain - Standard  Goal: Alleviation of pain or a reduction in pain to the patient’s comfort goal  Outcome: Progressing   The patient is Stable - Low risk of patient condition declining or worsening    Shift Goals  Clinical Goals: legal hold, safety  Patient Goals: sleep  Family Goals: n/a    Progress made toward(s) clinical / shift goals:      Patient is not progressing towards the following goals:

## 2024-06-15 NOTE — CARE PLAN
The patient is Stable - Low risk of patient condition declining or worsening    Shift Goals  Clinical Goals: safety  Patient Goals: Pain control, rest  Family Goals: n/a    Progress made toward(s) clinical / shift goals:    Problem: Knowledge Deficit - Standard  Goal: Patient and family/care givers will demonstrate understanding of plan of care, disease process/condition, diagnostic tests and medications  Outcome: Progressing     Problem: Provide Safe Environment  Goal: Suicide environmental safety, protocols, policies, and practices will be implemented  Outcome: Progressing     Problem: Psychosocial  Goal: Patient's ability to identify and develop effective coping behaviors will improve  Outcome: Progressing  Goal: Patient's ability to identify and utilize available support systems will improve  Outcome: Progressing     Problem: Fall Risk  Goal: Patient will remain free from falls  Outcome: Progressing     Problem: Pain - Standard  Goal: Alleviation of pain or a reduction in pain to the patient’s comfort goal  Outcome: Progressing       Patient is not progressing towards the following goals:

## 2024-06-15 NOTE — PROGRESS NOTES
Hospital Medicine Daily Progress Note    Date of Service  6/15/2024    Chief Complaint  Edith Glass is a 43 y.o. female admitted 6/13/2024 with SI attempt    Hospital Course  This is a 43-year-old female with past medical history of depression, prior history of suicide attempts who was admitted on 6/13/2024 after a suicide attempt.    Patient consumed multiple tablets of clonidine, gabapentin and tizanidine in an attempt to end her life.  Patient was placed on a legal hold by [employer].  Case was discussed with poison control, recommended 24 observation on telemetry to monitor for any respiratory depression or hypotension.  Psychiatry consulted, legal hold extended, patient started on Zoloft.      Interval Problem Update  Patient's vitals are stable, labs unremarkable. Medically cleared, transfer to medical floor.    Pending further psychiatry recommendations.    I have discussed this patient's plan of care and discharge plan at IDT rounds today with Case Management, Nursing, Nursing leadership, and other members of the IDT team.    Consultants/Specialty  psychiatry    Code Status  Full Code    Disposition  The patient is medically cleared for discharge to home or a post-acute facility.  Anticipate discharge to: a psychiatric hospital    I have placed the appropriate orders for post-discharge needs.    Review of Systems  Review of Systems   All other systems reviewed and are negative.       Physical Exam  Temp:  [36 °C (96.8 °F)-36.6 °C (97.9 °F)] 36.6 °C (97.9 °F)  Pulse:  [52-94] 57  Resp:  [18-20] 18  BP: ()/(51-93) 106/93  SpO2:  [95 %-96 %] 96 %    Physical Exam  Vitals and nursing note reviewed.   Constitutional:       General: She is not in acute distress.     Appearance: Normal appearance.   HENT:      Head: Normocephalic and atraumatic.      Mouth/Throat:      Mouth: Mucous membranes are moist.      Pharynx: No oropharyngeal exudate.   Eyes:      Extraocular Movements:  Extraocular movements intact.      Pupils: Pupils are equal, round, and reactive to light.   Cardiovascular:      Rate and Rhythm: Normal rate and regular rhythm.      Pulses: Normal pulses.      Heart sounds: No murmur heard.     No friction rub. No gallop.   Pulmonary:      Effort: Pulmonary effort is normal. No respiratory distress.      Breath sounds: No wheezing, rhonchi or rales.   Abdominal:      General: Bowel sounds are normal. There is no distension.      Palpations: Abdomen is soft. There is no mass.      Tenderness: There is no abdominal tenderness.   Musculoskeletal:         General: No swelling or tenderness. Normal range of motion.      Cervical back: Normal range of motion. No rigidity. No muscular tenderness.      Right lower leg: No edema.      Left lower leg: No edema.   Skin:     General: Skin is warm and dry.      Capillary Refill: Capillary refill takes less than 2 seconds.      Findings: No erythema or rash.   Neurological:      General: No focal deficit present.      Mental Status: She is alert and oriented to person, place, and time.      Motor: No weakness.      Gait: Gait normal.         Fluids    Intake/Output Summary (Last 24 hours) at 6/15/2024 1209  Last data filed at 6/14/2024 2300  Gross per 24 hour   Intake 780 ml   Output --   Net 780 ml       Laboratory  Recent Labs     06/12/24  1318 06/13/24  1359 06/14/24  0053   WBC 11.2* 12.8* 11.9*   RBC 5.70* 5.02 4.81   HEMOGLOBIN 15.8 14.2 13.1   HEMATOCRIT 48.0* 41.9 41.2   MCV 84.2 83.5 85.7   MCH 27.7 28.3 27.2   MCHC 32.9 33.9 31.8*   RDW 44.9 44.6 45.2   PLATELETCT 355 293 266   MPV 10.2 9.2 9.4     Recent Labs     06/12/24  1318 06/13/24  1359 06/14/24  0053   SODIUM 139 137 138   POTASSIUM 3.9 4.0 3.9   CHLORIDE 103 104 107   CO2 22 21 20   GLUCOSE 103* 100* 108*   BUN 8 6* 5*   CREATININE 0.50 0.55 0.41*   CALCIUM 10.1 9.0 8.6                   Imaging  No orders to display        Assessment/Plan  * Suicide attempt (HCC)- (present  on admission)  Assessment & Plan  Ms. Glass was here with a confirmed overdose of gabapentin, tizanidine and clonidine; she has no other known overdoses.  This was intentional for suicide.  Patient is currently on legal hold.  Started by [employer]    Psychiatry following, started Zoloft    Depression- (present on admission)  Assessment & Plan  Just attempted suicide with drug overdose.  Started on legal hold.  Psychiatry following, started Zoloft    Overdose of cardiac medication, intentional self-harm, initial encounter ([facility])- (present on admission)  Assessment & Plan  Ms. Glass was here with a confirmed overdose of gabapentin, tizanidine and clonidine; she has no other known overdoses.  This was intentional for suicide.  Patient is currently on legal hold.  Case discussed with poison control who recommended 24-hour observation on telemetry to monitor for hypotension and respiratory depression    Bradycardia- (present on admission)  Assessment & Plan  Being admitted for confirmed overdose of gabapentin, tizanidine and clonidine     Telemetry monitoring   Case discussed with poison control who recommended 24-hour observation on telemetry to monitor for hypotension and respiratory depression  EKG shows sinus bradycardia with a rate of 58.  There is no ST elevation.  There is flattening of T waves in lead III.  QTc is 425     Dehydration- (present on admission)  Assessment & Plan  Likely secondary to reduced oral intake   Encourage oral intake as tolerated, antiemetics as needed.  Intravenous hydration until adequate oral intake is achieved.     Cigarette smoker- (present on admission)  Assessment & Plan  Nicotine replacement protocol and cessation education provided for 12 minutes, discussed options of nicotine patch, acupuncture, medical treatment with wellbutrin and chantix. Discussed other options.          VTE prophylaxis: Ambulatory    I have performed a physical exam and reviewed and updated  ROS and Plan today (6/15/2024). In review of yesterday's note (6/14/2024), there are no changes except as documented above.    Greater than 51 minutes spent prepping to see patient (e.g. reviewing EMR) obtaining and/or reviewing separately obtained history. Performing a medically appropriate examination and evaluation. Independently interpreting results and communicating results to patient/family/caregiver. Counseling and educating the patient/family/caregiver. Ordering medications, tests, and/or procedures. Referring and communicating with other health care professionals.  Documenting clinical information in EPIC. Care coordination.

## 2024-06-15 NOTE — DISCHARGE PLANNING
Case Management Discharge Planning    Admission Date: 6/13/2024  GMLOS: 3.9  ALOS: 1    6-Clicks ADL Score: 24  6-Clicks Mobility Score: 24      Anticipated Discharge Dispo: Discharge Disposition: D/T to psych hosp or distinct part unit (65)      Action(s) Taken: LMSW faced over [facility] legal hold paper work to Alert team fax.

## 2024-06-15 NOTE — ASSESSMENT & PLAN NOTE
Nicotine replacement protocol and cessation education provided for 12 minutes, discussed options of nicotine patch, acupuncture, medical treatment with wellbutrin and chantix. Discussed other options.

## 2024-06-16 VITALS
HEART RATE: 63 BPM | SYSTOLIC BLOOD PRESSURE: 115 MMHG | DIASTOLIC BLOOD PRESSURE: 74 MMHG | WEIGHT: 193.12 LBS | HEIGHT: 69 IN | OXYGEN SATURATION: 97 % | TEMPERATURE: 97.7 F | RESPIRATION RATE: 18 BRPM | BODY MASS INDEX: 28.6 KG/M2

## 2024-06-16 PROCEDURE — 99239 HOSP IP/OBS DSCHRG MGMT >30: CPT | Performed by: GENERAL PRACTICE

## 2024-06-16 PROCEDURE — 700102 HCHG RX REV CODE 250 W/ 637 OVERRIDE(OP): Performed by: HOSPITALIST

## 2024-06-16 PROCEDURE — A9270 NON-COVERED ITEM OR SERVICE: HCPCS

## 2024-06-16 PROCEDURE — 90791 PSYCH DIAGNOSTIC EVALUATION: CPT | Performed by: SOCIAL WORKER

## 2024-06-16 PROCEDURE — 700102 HCHG RX REV CODE 250 W/ 637 OVERRIDE(OP)

## 2024-06-16 PROCEDURE — A9270 NON-COVERED ITEM OR SERVICE: HCPCS | Performed by: HOSPITALIST

## 2024-06-16 PROCEDURE — 700102 HCHG RX REV CODE 250 W/ 637 OVERRIDE(OP): Performed by: GENERAL PRACTICE

## 2024-06-16 PROCEDURE — A9270 NON-COVERED ITEM OR SERVICE: HCPCS | Performed by: GENERAL PRACTICE

## 2024-06-16 RX ORDER — IBUPROFEN 600 MG/1
600 TABLET ORAL EVERY 6 HOURS PRN
Status: DISCONTINUED | OUTPATIENT
Start: 2024-06-16 | End: 2024-06-16 | Stop reason: HOSPADM

## 2024-06-16 RX ADMIN — IBUPROFEN 600 MG: 600 TABLET, FILM COATED ORAL at 12:19

## 2024-06-16 RX ADMIN — ACETAMINOPHEN 650 MG: 325 TABLET, FILM COATED ORAL at 05:28

## 2024-06-16 RX ADMIN — NICOTINE TRANSDERMAL SYSTEM 21 MG: 21 PATCH, EXTENDED RELEASE TRANSDERMAL at 05:23

## 2024-06-16 RX ADMIN — SERTRALINE 150 MG: 100 TABLET, FILM COATED ORAL at 05:23

## 2024-06-16 ASSESSMENT — FIBROSIS 4 INDEX: FIB4 SCORE: 0.67

## 2024-06-16 ASSESSMENT — PAIN DESCRIPTION - PAIN TYPE
TYPE: ACUTE PAIN
TYPE: CHRONIC PAIN

## 2024-06-16 NOTE — CARE PLAN
The patient is Stable - Low risk of patient condition declining or worsening    Shift Goals  Clinical Goals: safety  Patient Goals: rest, pain control  Family Goals: n/a      Problem: Knowledge Deficit - Standard  Goal: Patient and family/care givers will demonstrate understanding of plan of care, disease process/condition, diagnostic tests and medications  Outcome: Progressing     Problem: Provide Safe Environment  Goal: Suicide environmental safety, protocols, policies, and practices will be implemented  Outcome: Progressing     Problem: Psychosocial  Goal: Patient's ability to identify and develop effective coping behaviors will improve  Outcome: Progressing  Goal: Patient's ability to identify and utilize available support systems will improve  Outcome: Progressing     Problem: Fall Risk  Goal: Patient will remain free from falls  Outcome: Progressing

## 2024-06-16 NOTE — CARE PLAN
The patient is Stable - Low risk of patient condition declining or worsening    Shift Goals  Clinical Goals: safety  Patient Goals: rest, pain control  Family Goals: n/a    Progress made toward(s) clinical / shift goals:    Problem: Knowledge Deficit - Standard  Goal: Patient and family/care givers will demonstrate understanding of plan of care, disease process/condition, diagnostic tests and medications  Outcome: Progressing     Problem: Provide Safe Environment  Goal: Suicide environmental safety, protocols, policies, and practices will be implemented  Outcome: Progressing     Problem: Psychosocial  Goal: Patient's ability to identify and develop effective coping behaviors will improve  Outcome: Progressing  Goal: Patient's ability to identify and utilize available support systems will improve  Outcome: Progressing     Problem: Fall Risk  Goal: Patient will remain free from falls  Outcome: Progressing     Problem: Depression  Goal: Patient and family/caregiver will verbalize accurate information about at least two of the possible causes of depression, three-four of the signs and symptoms of depression  Outcome: Progressing     Problem: Pain - Standard  Goal: Alleviation of pain or a reduction in pain to the patient’s comfort goal  Outcome: Progressing       Patient is not progressing towards the following goals:

## 2024-06-16 NOTE — PROGRESS NOTES
Bedside report received from off going RN/tech: SYDNI Siddiqi assumed care of patient.     Fall Risk Score: NO RISK  Fall risk interventions in place: Provide patient/family education based on risk assessment and Place patient in room close to nursing station  Bed type: Regular (Fabien Score less than 17 interventions in place)  Patient on cardiac monitor: No   IVF/IV medications: Not Applicable   Oxygen: Room Air  Bedside sitter: Pt on L2k SI with 1:1 sitter Merna (name)  Isolation: Not applicable
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
## 2024-06-16 NOTE — PROGRESS NOTES
Pt dc'd in stable and satisfactory fashion. IV and monitor removed; monitor room notified. Pt left unit via walking with daughter. Personal belongings with pt when leaving unit. Pt given discharge instructions prior to leaving unit including where to  prescriptions and when to follow-up; verbalizes understanding. Copy of discharge instructions with pt and in the chart.  Legal hold release faxed and scanned into chart.

## 2024-06-16 NOTE — CONSULTS
[facility]  Crisis/Safety Plan    Name:  Edith Glass  MRN:  [id number]  Date:  2024    Warning signs that a crisis may be developing for me or I may be at risk:  1) Negative thoughts  2) Advocate for herself  3) Sweating  4) stuttering      Coping strategies I can use on my own (relaxation, physical activity, etc):  1) self care  2) walks  3) deep breaths  4). stretches    Ways I can make my environment safe:  1)Avoid conflict  2) stay positive  3) Clean  4) manage my medication    Things I want to tell myself when I feel a crisis developin) This is temporary, this too shall pass  2) Take a shower, listen to happy music  3) I'm taking a time out and walk away-boundary    People I can contact for support or distraction (and their phone numbers):  1) My sister-Jahaira  2) My best friend-Sheila  3) My therapist-Emil    If I’m not able to reach my support people, or the above strategies don’t help, I can contact the following professionals, agencies, or hotlines:  1) Crisis Call Center ():  [phone] -OR- [phone]  2) Crisis Text Line ():  Text CARE TO 820004  3)   4)     Pamela Caldera, Ph.D., [facility]

## 2024-06-16 NOTE — PROGRESS NOTES
Per Dr. Ocampo, it is ok for patient to have no IV access at this time.  IV removed from left hand.

## 2024-06-16 NOTE — PROGRESS NOTES
Bedside report received from off going RN/tech: Leela, assumed care of patient.     Fall Risk Score: NO RISK  Fall risk interventions in place: Place yellow fall risk ID band on patient, Educate patient/family to call staff for assistance when getting out of bed, Place fall precaution signage outside patient door, Place patient in room close to nursing station, and Utilize bed/chair fall alarm  Bed type: Regular (Fabien Score less than 17 interventions in place)  Patient on cardiac monitor: No   IVF/IV medications: Not Applicable   Oxygen: Room Air  Bedside sitter:  1:1 sitter legal hold  Isolation: Not applicable

## 2024-06-16 NOTE — DISCHARGE SUMMARY
Discharge Summary    CHIEF COMPLAINT ON ADMISSION  Chief Complaint   Patient presents with    Suicidal Ideation     Pt BIBA for SI attempt by OD. Pt states she took clonidine, gabapentin, and tizanidine. GCS 15.        Reason for Admission  ems     Admission Date  6/13/2024    CODE STATUS  Full Code    HPI & HOSPITAL COURSE  This is a 43-year-old female with past medical history of depression, prior history of suicide attempts who was admitted on 6/13/2024 after a suicide attempt.    Patient consumed multiple tablets of clonidine, gabapentin and tizanidine in an attempt to end her life.  Patient was [place] on a legal hold by [place] [employer].  Case was discussed with poison control, recommended 24 observation on telemetry to monitor for any respiratory depression or hypotension.  Psychiatry consulted, legal hold extended, patient started on Zoloft.    Patient cleared by psychiatry to resume all home medications, legal hold discontinued.  Patient will follow-up with psychiatry outpatient.    Therefore, she is discharged in good and stable condition to home with close outpatient follow-up.    The patient met 2-midnight criteria for an inpatient stay at the time of discharge.    Discharge Date  6/16/2024    FOLLOW UP ITEMS POST DISCHARGE  Primary care physician  Psychiatry    DISCHARGE DIAGNOSES  Principal Problem:    Suicide attempt (HCC) (POA: Yes)  Active Problems:    Overdose of cardiac medication, intentional self-harm, initial encounter (HCC) (POA: Yes)    Depression (POA: Yes)    Bradycardia (POA: Yes)    Cigarette smoker (POA: Yes)    Dehydration (POA: Yes)  Resolved Problems:    * No resolved hospital problems. *      FOLLOW UP psychiatry  SIMON Charles  [address]  [address]  [place] [id number] [phone]    Follow up        MEDICATIONS ON DISCHARGE     Medication List        CONTINUE taking these medications        Instructions   albuterol 108 (90 Base) MCG/ACT Aers inhalation aerosol    Inhale 2 Puffs by mouth every 6 hours as needed for Shortness of Breath.  Dose: 2 Puff     * sertraline 50 MG Tabs  Commonly known as: Zoloft   Take 50 mg by mouth every day. Along with 100mg tablets= 150mg total dose  Dose: 50 mg     * sertraline 100 MG Tabs  Commonly known as: Zoloft   Take 100 mg by mouth every day. Along with 50mg tablet= 150mg total dose  Dose: 100 mg           * This list has 2 medication(s) that are the same as other medications prescribed for you. Read the directions carefully, and ask your doctor or other care provider to review them with you.                STOP taking these medications      Ajovy 225 MG/1.5ML Soaj  Generic drug: Fremanezumab-vfrm     Botox 200 units Solr injection  Generic drug: onabotulinum toxin A     diphenhydrAMINE 25 MG Tabs  Commonly known as: Benadryl     gabapentin 400 MG Caps  Commonly known as: Neurontin     Nurtec 75 MG Tbdp  Generic drug: Rimegepant Sulfate     tizanidine 2 MG tablet  Commonly known as: Zanaflex              Allergies  Allergies   Allergen Reactions    Morphine     Nkda [No Known Drug Allergy]        DIET  Orders Placed This Encounter   Procedures    Diet Order Diet: Regular; Tray Modifications (optional): Safety Tray - Plastic dishware, utensils unwrapped (Suicide Watch)     Paperware only on meal tray.     Standing Status:   Standing     Number of Occurrences:   1     Order Specific Question:   Diet:     Answer:   Regular [1]     Order Specific Question:   Tray Modifications (optional)     Answer:   Safety Tray - Plastic dishware, utensils unwrapped (Suicide Watch)       ACTIVITY  As tolerated.  Weight bearing as tolerated    CONSULTATIONS  Psychiatry    PROCEDURES  None    LABORATORY  Lab Results   Component Value Date    SODIUM 138 06/14/2024    POTASSIUM 3.9 06/14/2024    CHLORIDE 107 06/14/2024    CO2 20 06/14/2024    GLUCOSE 108 (H) 06/14/2024    BUN 5 (L) 06/14/2024    CREATININE 0.41 (L) 06/14/2024    CREATININE 0.6 09/12/2008         Lab Results   Component Value Date    WBC 11.9 (H) 06/14/2024    HEMOGLOBIN 13.1 06/14/2024    HEMATOCRIT 41.2 06/14/2024    PLATELETCT 266 06/14/2024        Total time of the discharge process exceeds 45 minutes.

## 2024-06-17 ASSESSMENT — ENCOUNTER SYMPTOMS
MUSCULOSKELETAL NEGATIVE: 1
CARDIOVASCULAR NEGATIVE: 1
CONSTITUTIONAL NEGATIVE: 1
NEUROLOGICAL NEGATIVE: 1
GASTROINTESTINAL NEGATIVE: 1
EYES NEGATIVE: 1
RESPIRATORY NEGATIVE: 1

## 2024-09-13 ENCOUNTER — OFFICE VISIT (OUTPATIENT)
Dept: URGENT CARE | Facility: PHYSICIAN GROUP | Age: 44
End: 2024-09-13
Payer: MEDICAID

## 2024-09-13 VITALS
BODY MASS INDEX: 30.42 KG/M2 | OXYGEN SATURATION: 96 % | SYSTOLIC BLOOD PRESSURE: 124 MMHG | WEIGHT: 206 LBS | HEART RATE: 91 BPM | TEMPERATURE: 97.8 F | RESPIRATION RATE: 16 BRPM | DIASTOLIC BLOOD PRESSURE: 76 MMHG

## 2024-09-13 DIAGNOSIS — M51.26 HERNIATED INTERVERTEBRAL DISC OF LUMBAR SPINE: ICD-10-CM

## 2024-09-13 DIAGNOSIS — G89.29 CHRONIC RIGHT-SIDED LOW BACK PAIN WITHOUT SCIATICA: ICD-10-CM

## 2024-09-13 DIAGNOSIS — M54.50 CHRONIC RIGHT-SIDED LOW BACK PAIN WITHOUT SCIATICA: ICD-10-CM

## 2024-09-13 PROCEDURE — 3078F DIAST BP <80 MM HG: CPT | Performed by: NURSE PRACTITIONER

## 2024-09-13 PROCEDURE — 99214 OFFICE O/P EST MOD 30 MIN: CPT | Performed by: NURSE PRACTITIONER

## 2024-09-13 PROCEDURE — 3074F SYST BP LT 130 MM HG: CPT | Performed by: NURSE PRACTITIONER

## 2024-09-13 RX ORDER — OMEPRAZOLE 40 MG/1
40 CAPSULE, DELAYED RELEASE ORAL DAILY
COMMUNITY
Start: 2024-08-07

## 2024-09-13 RX ORDER — BUSPIRONE HYDROCHLORIDE 10 MG/1
10 TABLET ORAL 2 TIMES DAILY
COMMUNITY
Start: 2024-08-29

## 2024-09-13 RX ORDER — HYDROCODONE BITARTRATE AND ACETAMINOPHEN 5; 325 MG/1; MG/1
1 TABLET ORAL EVERY 4 HOURS PRN
Qty: 15 TABLET | Refills: 0 | Status: SHIPPED | OUTPATIENT
Start: 2024-09-13 | End: 2024-09-18

## 2024-09-13 RX ORDER — ESCITALOPRAM OXALATE 20 MG/1
TABLET ORAL
COMMUNITY

## 2024-09-13 RX ORDER — RIMEGEPANT SULFATE 75 MG/75MG
TABLET, ORALLY DISINTEGRATING ORAL
COMMUNITY
Start: 2024-07-30

## 2024-09-13 RX ORDER — PREDNISONE 20 MG/1
40 TABLET ORAL DAILY
Qty: 10 TABLET | Refills: 0 | Status: SHIPPED | OUTPATIENT
Start: 2024-09-13 | End: 2024-09-18

## 2024-09-13 RX ORDER — ONABOTULINUMTOXINA 200 [USP'U]/1
INJECTION, POWDER, LYOPHILIZED, FOR SOLUTION INTRADERMAL; INTRAMUSCULAR
COMMUNITY
Start: 2024-07-11

## 2024-09-13 RX ORDER — FREMANEZUMAB-VFRM 225 MG/1.5ML
INJECTION SUBCUTANEOUS
COMMUNITY

## 2024-09-13 RX ORDER — ONDANSETRON 4 MG/1
TABLET, ORALLY DISINTEGRATING ORAL
COMMUNITY
Start: 2024-08-07

## 2024-09-13 RX ORDER — LURASIDONE HYDROCHLORIDE 40 MG/1
TABLET, FILM COATED ORAL
COMMUNITY
Start: 2024-07-11

## 2024-09-13 RX ORDER — GABAPENTIN 400 MG/1
400 CAPSULE ORAL 3 TIMES DAILY
COMMUNITY
Start: 2024-07-26

## 2024-09-13 RX ORDER — ARIPIPRAZOLE 5 MG/1
1 TABLET ORAL EVERY MORNING
COMMUNITY

## 2024-09-13 RX ORDER — CARIPRAZINE 1.5 MG/1
CAPSULE, GELATIN COATED ORAL
COMMUNITY
Start: 2024-08-01

## 2024-09-13 RX ORDER — CYCLOBENZAPRINE HCL 5 MG
5-10 TABLET ORAL 3 TIMES DAILY PRN
Qty: 30 TABLET | Refills: 0 | Status: SHIPPED | OUTPATIENT
Start: 2024-09-13 | End: 2024-09-18

## 2024-09-13 RX ORDER — CARIPRAZINE 3 MG/1
3 CAPSULE, GELATIN COATED ORAL EVERY MORNING
COMMUNITY
Start: 2024-08-29

## 2024-09-13 ASSESSMENT — ENCOUNTER SYMPTOMS: BACK PAIN: 1

## 2024-09-13 ASSESSMENT — FIBROSIS 4 INDEX: FIB4 SCORE: 0.67

## 2024-09-14 NOTE — PROGRESS NOTES
Subjective:     Edith Glass is a 44 y.o. female who presents for Back Pain (Happened due to lifting )      Back Pain      Pt presents for evaluation of a new problem.  Edith is a very pleasant 44-year-old female presents to urgent care today with complaints of acute on chronic low back pain.  She does suffer from multiple herniated disks and does see physiatry and [facility].  She notes last night she bent down to  a 5 gallon jug of water when her back went out.  She has been experiencing continuous spasming since this time.  Her pain does radiate from right to left.  She does note paresthesia of lateral lower extremities however, this is normal for her.  She has also had a history of a cervical spine fusion.  Her pain is rated as severe.  She denies any changes in bowel or bladder.  Negative for abdominal pain.    Review of Systems   Musculoskeletal:  Positive for back pain.       PMH:   Past Medical History:   Diagnosis Date    Adverse effect of anesthesia     hypotension with colonscopy    Heart burn     Indigestion     Psychiatric disorder depression    Sleep apnea     no CPAP     ALLERGIES:   Allergies   Allergen Reactions    Morphine     Nkda [No Known Drug Allergy]      SURGHX:   Past Surgical History:   Procedure Laterality Date    CERVICAL DISK AND FUSION ANTERIOR N/A 10/10/2023    Procedure: CERVICAL 5-6 ANTERIOR CERVICAL DISCECTOMY AND FUSION;  Surgeon: Micheal Rodriguez M.D.;  Location: SURGERY [facility];  Service: Neurosurgery    OTHER ORTHOPEDIC SURGERY  2005    NECK SX    CHOLECYSTECTOMY      HYSTERECTOMY LAPAROSCOPY       SOCHX:   Social History     Socioeconomic History    Marital status: Single    Highest education level: GED or equivalent   Tobacco Use    Smoking status: Every Day     Current packs/day: 1.00     Average packs/day: 1 pack/day for 30.0 years (30.0 ttl pk-yrs)     Types: Cigarettes     Passive exposure: Current    Smokeless tobacco: Never   Vaping Use     Vaping status: Every Day    Substances: Nicotine, Flavoring    Devices: Disposable    Passive vaping exposure: Yes   Substance and Sexual Activity    Alcohol use: Not Currently     Comment: sober 1.5 yrs    Drug use: No    Sexual activity: Yes     Partners: Male   Social History Narrative    ** Merged History Encounter **          Social Determinants of Health     Financial Resource Strain: Low Risk  (6/14/2024)    Overall Financial Resource Strain (CARDIA)     Difficulty of Paying Living Expenses: Not very hard   Food Insecurity: Food Insecurity Present (6/14/2024)    Hunger Vital Sign     Worried About Running Out of Food in the Last Year: Sometimes true     Ran Out of Food in the Last Year: Sometimes true   Transportation Needs: No Transportation Needs (6/14/2024)    PRAPARE - Transportation     Lack of Transportation (Medical): No     Lack of Transportation (Non-Medical): No   Physical Activity: Sufficiently Active (6/14/2024)    Exercise Vital Sign     Days of Exercise per Week: 5 days     Minutes of Exercise per Session: 50 min   Stress: Stress Concern Present (6/14/2024)    [employer] [place] - Occupational Stress Questionnaire     Feeling of Stress : Very much   Social Connections: Socially Isolated (6/14/2024)    Social Connection and Isolation Panel [NHANES]     Frequency of Communication with Friends and Family: More than three times a week     Frequency of Social Gatherings with Friends and Family: Patient declined     Attends Yazidi Services: Never     Active Member of Clubs or Organizations: No     Attends Club or Organization Meetings: Patient declined     Marital Status: Never    Intimate Partner Violence: Not At Risk (6/13/2024)    Humiliation, Afraid, Rape, and Kick questionnaire     Fear of Current or Ex-Partner: No     Emotionally Abused: No     Physically Abused: No     Sexually Abused: No   Housing Stability: Low Risk  (6/14/2024)    Housing Stability Vital Sign      Unable to Pay for Housing in the Last Year: No     Number of Places Lived in the Last Year: 1     Unstable Housing in the Last Year: No     FH:   Family History   Problem Relation Age of Onset    Heart Disease Mother          Objective:   /76   Pulse 91   Temp 36.6 °C (97.8 °F) (Temporal)   Resp 16   Wt 93.4 kg (206 lb)   LMP 11/05/2009   SpO2 96%   BMI 30.42 kg/m²     Physical Exam  Vitals and nursing note reviewed.   Constitutional:       General: She is not in acute distress.     Appearance: Normal appearance. She is normal weight. She is not ill-appearing or toxic-appearing.   HENT:      Head: Normocephalic.      Right Ear: External ear normal.      Left Ear: External ear normal.      Nose: No congestion or rhinorrhea.      Mouth/Throat:      Pharynx: No oropharyngeal exudate or posterior oropharyngeal erythema.   Eyes:      General:         Right eye: No discharge.         Left eye: No discharge.      Pupils: Pupils are equal, round, and reactive to light.   Pulmonary:      Effort: Pulmonary effort is normal.   Abdominal:      General: Abdomen is flat.   Musculoskeletal:         General: Normal range of motion.      Cervical back: Normal range of motion and neck supple.        Back:       Comments: Patient unable to get off and on exam table.  There is tenderness with palpation to right lumbar back.   Skin:     General: Skin is dry.   Neurological:      General: No focal deficit present.      Mental Status: She is alert and oriented to person, place, and time. Mental status is at baseline.   Psychiatric:         Mood and Affect: Mood normal.         Behavior: Behavior normal.         Thought Content: Thought content normal.         Judgment: Judgment normal.         Assessment/Plan:   Assessment    1. Chronic right-sided low back pain without sciatica  predniSONE (DELTASONE) 20 MG Tab    cyclobenzaprine (FLEXERIL) 5 mg tablet    HYDROcodone-acetaminophen (NORCO) 5-325 MG Tab per tablet      2.  Herniated intervertebral disc of lumbar spine  predniSONE (DELTASONE) 20 MG Tab    cyclobenzaprine (FLEXERIL) 5 mg tablet    HYDROcodone-acetaminophen (NORCO) 5-325 MG Tab per tablet        Patient was given short course of hydrocodone for relief of severe pain.   reviewed and I do not believe that she is at an increased risk for abuse of this medication.  Side effects of pain medication discussed.  She was also prescribed Flexeril for relief of spasms and prednisone for relief of inflammation.  Patient did take ibuprofen earlier today and is unable to have Toradol injection.  She is to notify me if she would like an injection tomorrow and I will happily order this for her.  She is in agreement with plan of care.

## 2024-11-09 ENCOUNTER — OFFICE VISIT (OUTPATIENT)
Dept: URGENT CARE | Facility: PHYSICIAN GROUP | Age: 44
End: 2024-11-09
Payer: MEDICAID

## 2024-11-09 VITALS
HEART RATE: 99 BPM | WEIGHT: 215 LBS | TEMPERATURE: 97.8 F | BODY MASS INDEX: 31.84 KG/M2 | HEIGHT: 69 IN | DIASTOLIC BLOOD PRESSURE: 76 MMHG | RESPIRATION RATE: 20 BRPM | OXYGEN SATURATION: 96 % | SYSTOLIC BLOOD PRESSURE: 124 MMHG

## 2024-11-09 DIAGNOSIS — B37.0 ORAL CANDIDIASIS: ICD-10-CM

## 2024-11-09 DIAGNOSIS — B37.0 CANDIDAL STOMATITIS: ICD-10-CM

## 2024-11-09 PROCEDURE — 3074F SYST BP LT 130 MM HG: CPT | Performed by: FAMILY MEDICINE

## 2024-11-09 PROCEDURE — 3078F DIAST BP <80 MM HG: CPT | Performed by: FAMILY MEDICINE

## 2024-11-09 PROCEDURE — 99213 OFFICE O/P EST LOW 20 MIN: CPT | Performed by: FAMILY MEDICINE

## 2024-11-09 RX ORDER — NYSTATIN 100000 [USP'U]/ML
500000 SUSPENSION ORAL 4 TIMES DAILY
Qty: 140 ML | Refills: 0 | Status: SHIPPED | OUTPATIENT
Start: 2024-11-09 | End: 2024-11-16

## 2024-11-09 ASSESSMENT — ENCOUNTER SYMPTOMS
COUGH: 0
NAUSEA: 0
FEVER: 0
SORE THROAT: 0
MYALGIAS: 0
DIZZINESS: 0
SWOLLEN GLANDS: 0
VOMITING: 0
SHORTNESS OF BREATH: 0
TROUBLE SWALLOWING: 0
CHILLS: 0

## 2024-11-09 ASSESSMENT — FIBROSIS 4 INDEX: FIB4 SCORE: 0.67

## 2024-11-10 NOTE — PROGRESS NOTES
Subjective:   Edith Glass is a 44 y.o. female who presents for Tongue Swelling (X 4 days w/ itching and burning, pt sts she feels she has been getting more heart palpitations the last week /She sts she does smoke but smoking has been burning her tongue ), Shoulder Pain (Right shoulder pain ), and Headache (X 4 days )        Pharyngitis   This is a new (Reports red swollen, itchy tongue over the past 4 days) problem. The current episode started in the past 7 days. The problem has been unchanged. The pain is mild. Pertinent negatives include no congestion, coughing, shortness of breath, swollen glands, trouble swallowing or vomiting. Associated symptoms comments: Denies sore throat, denies pain with swallowing    Reports history of chronic migraine headaches and compliance with medication . She has had no exposure to strep. She has tried nothing for the symptoms.     PMH:  has a past medical history of Adverse effect of anesthesia, Heart burn, Indigestion, Psychiatric disorder (depression), and Sleep apnea.  MEDS:   Current Outpatient Medications:     nystatin (MYCOSTATIN) 684667 UNIT/ML Suspension, Take 5 mL by mouth 4 times a day for 7 days., Disp: 140 mL, Rfl: 0    ARIPiprazole (ABILIFY) 5 MG tablet, Take 1 Tablet by mouth every morning., Disp: , Rfl:     busPIRone (BUSPAR) 10 MG Tab tablet, Take 10 mg by mouth 2 times a day., Disp: , Rfl:     VRAYLAR 3 MG capsule, Take 3 mg by mouth every morning., Disp: , Rfl:     AJOVY 225 MG/1.5ML Solution Auto-injector, INJECT 1 SYRINGE SUBCUTANEOUSLY ONCE EVERY MONTH, Disp: , Rfl:     gabapentin (NEURONTIN) 400 MG Cap, Take 400 mg by mouth 3 times a day., Disp: , Rfl:     BOTOX 200 units Recon Soln injection, give 155 units IM per  FDA approved paradigm for treatment of chronic migraine disorder q.12 weeks., Disp: , Rfl:     NURTEC 75 MG TABLET DISPERSIBLE, TAKE 1 TABLET BY MOUTH AT ONSET OF HEADACHE. OKAY TO REPEAT IN 24 HOURS IF NEEDED., Disp: , Rfl:      tizanidine (ZANAFLEX) 4 MG Tab, Take 4 mg by mouth 2 times a day as needed., Disp: , Rfl:     albuterol 108 (90 BASE) MCG/ACT Aero Soln inhalation aerosol, Inhale 2 Puffs by mouth every 6 hours as needed for Shortness of Breath., Disp: 8.5 g, Rfl: 3    VRAYLAR 1.5 MG capsule, TAKE 1 CAPSULE BY MOUTH ONCE DAILY IN THE MORNING AS DIRECTED (Patient not taking: Reported on 11/9/2024), Disp: , Rfl:     escitalopram (LEXAPRO) 20 MG tablet, TAKE 1/2 (ONE-HALF) TABLET BY MOUTH ONCE DAILY FOR 7 DAYS THEN 1 ONCE DAILY IN THE MORNING THEREAFTER (Patient not taking: Reported on 11/9/2024), Disp: , Rfl:     lurasidone (LATUDA) 40 MG Tab, TAKE 1 TABLET BY MOUTH IN THE EVENING WITH MEALS (Patient not taking: Reported on 11/9/2024), Disp: , Rfl:     omeprazole (PRILOSEC) 40 MG delayed-release capsule, Take 40 mg by mouth every day. (Patient not taking: Reported on 11/9/2024), Disp: , Rfl:     ondansetron (ZOFRAN ODT) 4 MG TABLET DISPERSIBLE, DISSOLVE 1 TABLET IN MOUTH EVERY 8 HOURS AS NEEDED FOR NAUSEA (Patient not taking: Reported on 11/9/2024), Disp: , Rfl:     sertraline (ZOLOFT) 50 MG Tab, Take 50 mg by mouth every day. Along with 100mg tablets= 150mg total dose (Patient not taking: Reported on 9/13/2024), Disp: , Rfl:     sertraline (ZOLOFT) 100 MG Tab, Take 100 mg by mouth every day. Along with 50mg tablet= 150mg total dose (Patient not taking: Reported on 11/9/2024), Disp: , Rfl:   ALLERGIES:   Allergies   Allergen Reactions    Morphine     Nkda [No Known Drug Allergy]      SURGHX:   Past Surgical History:   Procedure Laterality Date    CERVICAL DISK AND FUSION ANTERIOR N/A 10/10/2023    Procedure: CERVICAL 5-6 ANTERIOR CERVICAL DISCECTOMY AND FUSION;  Surgeon: Micheal Rodriguez M.D.;  Location: SURGERY [facility];  Service: Neurosurgery    OTHER ORTHOPEDIC SURGERY  2005    NECK SX    CHOLECYSTECTOMY      HYSTERECTOMY LAPAROSCOPY       SOCHX:  reports that she has been smoking cigarettes. She has a 30 pack-year smoking  "history. She has been exposed to tobacco smoke. She has never used smokeless tobacco. She reports that she does not currently use alcohol. She reports that she does not use drugs.  FH:   Family History   Problem Relation Age of Onset    Heart Disease Mother      Review of Systems   Constitutional:  Negative for chills and fever.   HENT:  Negative for congestion, sore throat and trouble swallowing.    Respiratory:  Negative for cough and shortness of breath.    Gastrointestinal:  Negative for nausea and vomiting.   Genitourinary:  Negative for dysuria.   Musculoskeletal:  Negative for myalgias.   Skin:  Negative for rash.   Neurological:  Negative for dizziness.        Objective:   /76   Pulse 99   Temp 36.6 °C (97.8 °F) (Temporal)   Resp 20   Ht 1.753 m (5' 9\")   Wt 97.5 kg (215 lb)   LMP 11/05/2009   SpO2 96%   BMI 31.75 kg/m²   Physical Exam  Vitals and nursing note reviewed.   Constitutional:       General: She is not in acute distress.     Appearance: She is well-developed.   HENT:      Head: Normocephalic and atraumatic.      Right Ear: External ear normal.      Left Ear: External ear normal.      Nose: Nose normal.      Mouth/Throat:      Mouth: Mucous membranes are moist.      Dentition: Gingival swelling present.      Tongue: No lesions. Tongue does not deviate from midline.      Pharynx: Oropharynx is clear. Uvula midline. No pharyngeal swelling, oropharyngeal exudate, posterior oropharyngeal erythema or uvula swelling.     Eyes:      Conjunctiva/sclera: Conjunctivae normal.   Cardiovascular:      Rate and Rhythm: Normal rate.   Pulmonary:      Effort: Pulmonary effort is normal. No respiratory distress.      Breath sounds: Normal breath sounds. No wheezing or rhonchi.   Abdominal:      General: There is no distension.   Musculoskeletal:         General: Normal range of motion.   Skin:     General: Skin is warm and dry.   Neurological:      General: No focal deficit present.      Mental " Status: She is alert and oriented to person, place, and time. Mental status is at baseline.      Gait: Gait (gait at baseline) normal.   Psychiatric:         Judgment: Judgment normal.           Assessment/Plan:   1. Oral candidiasis  - nystatin (MYCOSTATIN) 245236 UNIT/ML Suspension; Take 5 mL by mouth 4 times a day for 7 days.  Dispense: 140 mL; Refill: 0    2. Candidal stomatitis  - nystatin (MYCOSTATIN) 030120 UNIT/ML Suspension; Take 5 mL by mouth 4 times a day for 7 days.  Dispense: 140 mL; Refill: 0        Medical Decision Making/Course:  In the course of preparing for this visit with review of the pertinent past medical history, recent and past clinic visits, current medications, and performing chart, immunization, medical history and medication reconciliation, and in the further course of obtaining the current history pertinent to the clinic visit today, performing an exam and evaluation, ordering and independently evaluating labs, tests  , and/or procedures, prescribing any recommended new medications as noted above, providing any pertinent counseling and education and recommending further coordination of care, at least  15 minutes of total time were spent during this encounter.      Discussed close monitoring, return precautions, and supportive measures of maintaining adequate fluid hydration and caloric intake, relative rest and symptom management as needed for pain and/or fever.    Differential diagnosis, natural history, supportive care, and indications for immediate follow-up discussed.     Advised the patient to follow-up with the primary care physician for recheck, reevaluation, and consideration of further management.    Please note that this dictation was created using voice recognition software. I have worked with consultants from the vendor as well as technical experts from [employer] [facility] [employer] to optimize the interface. I have made every reasonable attempt to correct obvious errors, but I expect that  there are errors of grammar and possibly content that I did not discover before finalizing the note.

## 2024-11-18 ENCOUNTER — OFFICE VISIT (OUTPATIENT)
Dept: URGENT CARE | Facility: CLINIC | Age: 44
End: 2024-11-18

## 2024-11-18 ENCOUNTER — HOSPITAL ENCOUNTER (OUTPATIENT)
Facility: MEDICAL CENTER | Age: 44
End: 2024-11-18
Payer: COMMERCIAL

## 2024-11-18 VITALS
BODY MASS INDEX: 32.49 KG/M2 | SYSTOLIC BLOOD PRESSURE: 108 MMHG | DIASTOLIC BLOOD PRESSURE: 68 MMHG | OXYGEN SATURATION: 97 % | TEMPERATURE: 98.5 F | HEART RATE: 72 BPM | HEIGHT: 69 IN | RESPIRATION RATE: 16 BRPM | WEIGHT: 219.36 LBS

## 2024-11-18 DIAGNOSIS — Z02.1 ENCOUNTER FOR PRE-EMPLOYMENT HEALTH SCREENING EXAMINATION: ICD-10-CM

## 2024-11-18 DIAGNOSIS — Z02.1 PRE-EMPLOYMENT DRUG SCREENING: ICD-10-CM

## 2024-11-18 LAB
AMP AMPHETAMINE: NORMAL
COC COCAINE: NORMAL
INT CON NEG: NEGATIVE
INT CON POS: POSITIVE
MET METHAMPHETAMINES: NORMAL
OPI OPIATES: NORMAL
PCP PHENCYCLIDINE: NORMAL
POC DRUG COMMENT 753798-OCCUPATIONAL HEALTH: NEGATIVE
THC: NORMAL

## 2024-11-18 PROCEDURE — 80305 DRUG TEST PRSMV DIR OPT OBS: CPT

## 2024-11-18 PROCEDURE — 86480 TB TEST CELL IMMUN MEASURE: CPT

## 2024-11-18 PROCEDURE — 8915 PR COMPREHENSIVE PHYSICAL

## 2024-11-18 ASSESSMENT — FIBROSIS 4 INDEX: FIB4 SCORE: 0.67

## 2024-11-18 NOTE — PROGRESS NOTES
"Subjective:   Edith Glass is a 44 y.o. female who presents for Employment Physical ([place] physical an TB Quantiferon) and Drug / Alcohol Assessment      Edith Glass presents to Urgent Care for occupational medicine new employee physical with no acute concerns at todays visit.     ROS : See scanned documentation    Medications, Allergies, and current problem list reviewed today in Epic.     Objective:     /68   Pulse 72   Temp 36.9 °C (98.5 °F) (Temporal)   Resp 16   Ht 1.753 m (5' 9\")   Wt 99.5 kg (219 lb 5.7 oz)   SpO2 97%     Physical Exam : See scanned documentation    Assessment/Plan:     1. Pre-employment drug screening  - POCT 6 Panel Urine Drug Screen  - Quantiferon Gold Plus TB (4 tube); Future    2. Encounter for pre-employment health screening examination      - See scanned documentation  - Addressed any concerns  - Any red flags addressed in documentation to be dealt with by employer and/or primary care physician    Advised the patient to follow-up with the primary care physician for recheck, reevaluation, and consideration of further management.    Please note that this dictation was created using voice recognition software. I have made a reasonable attempt to correct obvious errors, but I expect that there are errors of grammar and possibly content that I did not discover before finalizing the note.    This note was electronically signed by AUGUSTO Flores.  "

## 2024-11-20 LAB
GAMMA INTERFERON BACKGROUND BLD IA-ACNC: 0.02 IU/ML
M TB IFN-G BLD-IMP: NEGATIVE
M TB IFN-G CD4+ BCKGRND COR BLD-ACNC: 0 IU/ML
MITOGEN IGNF BCKGRD COR BLD-ACNC: 5.86 IU/ML
QFT TB2 - NIL TBQ2: 0 IU/ML

## 2025-04-01 ENCOUNTER — RESULTS FOLLOW-UP (OUTPATIENT)
Dept: URGENT CARE | Facility: PHYSICIAN GROUP | Age: 45
End: 2025-04-01

## 2025-04-01 ENCOUNTER — OFFICE VISIT (OUTPATIENT)
Dept: URGENT CARE | Facility: PHYSICIAN GROUP | Age: 45
End: 2025-04-01
Payer: MEDICAID

## 2025-04-01 VITALS
RESPIRATION RATE: 16 BRPM | WEIGHT: 209 LBS | HEIGHT: 69 IN | OXYGEN SATURATION: 96 % | BODY MASS INDEX: 30.96 KG/M2 | DIASTOLIC BLOOD PRESSURE: 78 MMHG | SYSTOLIC BLOOD PRESSURE: 122 MMHG | TEMPERATURE: 97.5 F | HEART RATE: 75 BPM

## 2025-04-01 DIAGNOSIS — Z72.0 TOBACCO USER: ICD-10-CM

## 2025-04-01 DIAGNOSIS — Z03.818 ENCOUNTER FOR PATIENT CONCERN ABOUT EXPOSURE TO INFECTIOUS ORGANISM: ICD-10-CM

## 2025-04-01 LAB
FLUAV RNA SPEC QL NAA+PROBE: NEGATIVE
FLUBV RNA SPEC QL NAA+PROBE: NEGATIVE
RSV RNA SPEC QL NAA+PROBE: NEGATIVE
SARS-COV-2 RNA RESP QL NAA+PROBE: NEGATIVE

## 2025-04-01 PROCEDURE — 3074F SYST BP LT 130 MM HG: CPT | Performed by: FAMILY MEDICINE

## 2025-04-01 PROCEDURE — 99213 OFFICE O/P EST LOW 20 MIN: CPT | Mod: 25 | Performed by: FAMILY MEDICINE

## 2025-04-01 PROCEDURE — 3078F DIAST BP <80 MM HG: CPT | Performed by: FAMILY MEDICINE

## 2025-04-01 PROCEDURE — 99406 BEHAV CHNG SMOKING 3-10 MIN: CPT | Performed by: FAMILY MEDICINE

## 2025-04-01 PROCEDURE — 87637 SARSCOV2&INF A&B&RSV AMP PRB: CPT | Mod: QW | Performed by: FAMILY MEDICINE

## 2025-04-01 RX ORDER — DEXTROAMPHETAMINE SACCHARATE, AMPHETAMINE ASPARTATE MONOHYDRATE, DEXTROAMPHETAMINE SULFATE AND AMPHETAMINE SULFATE 2.5; 2.5; 2.5; 2.5 MG/1; MG/1; MG/1; MG/1
10 CAPSULE, EXTENDED RELEASE ORAL EVERY MORNING
COMMUNITY

## 2025-04-01 RX ORDER — SERTRALINE HYDROCHLORIDE 100 MG/1
100 TABLET, FILM COATED ORAL DAILY
COMMUNITY

## 2025-04-01 ASSESSMENT — FIBROSIS 4 INDEX: FIB4 SCORE: 0.67

## 2025-04-01 NOTE — LETTER
April 1, 2025    To Whom It May Concern:         This is confirmation that Edith Glass attended her scheduled appointment with Annie Gallagher M.D. on 4/01/25. She may return to work when she's had no fever for greater than 24 hours without the help of medication.          If you have any questions please do not hesitate to call me at the phone number listed below.    Sincerely,          Annie Gallagher M.D.  [phone]

## 2025-04-01 NOTE — PROGRESS NOTES
"  Subjective:      44 y.o. female presents to urgent care for cold symptoms that started Sunday night.  She is experiencing fever, headache, body ache, runny nose, sore throat, and cough. No diarrhea. She vapes tobacco products daily. No history of asthma or COPD.  She is vaccinated against COVID.  No known sick contacts.    She denies any other questions or concerns at this time.    Current problem list, medication, and past medical/surgical history were reviewed in Epic.    ROS  See HPI     Objective:      /78   Pulse 75   Temp 36.4 °C (97.5 °F) (Temporal)   Resp 16   Ht 1.753 m (5' 9\")   Wt 94.8 kg (209 lb)   LMP 11/05/2009   SpO2 96%   BMI 30.86 kg/m²     Physical Exam  Constitutional:       General: She is not in acute distress.     Appearance: She is not diaphoretic.   HENT:      Right Ear: Tympanic membrane, ear canal and external ear normal.      Left Ear: Tympanic membrane, ear canal and external ear normal.      Mouth/Throat:      Tongue: Tongue does not deviate from midline.      Palate: No lesions.      Pharynx: Uvula midline. No oropharyngeal exudate or posterior oropharyngeal erythema.      Tonsils: No tonsillar exudate. 1+ on the right. 1+ on the left.   Cardiovascular:      Rate and Rhythm: Normal rate and regular rhythm.      Heart sounds: Normal heart sounds.   Pulmonary:      Effort: Pulmonary effort is normal. No respiratory distress.      Breath sounds: Normal breath sounds.   Neurological:      Mental Status: She is alert.   Psychiatric:         Mood and Affect: Affect normal.         Judgment: Judgment normal.       Assessment/Plan:     1. Encounter for patient concern about exposure to infectious organism  Negative COVID, influenza, and RSV.  Symptoms are still most consistent with virus.  Tylenol, ibuprofen, rest, and hydration as needed for symptomatic relief.  - POCT CEPHEID COV-2, FLU A/B, RSV - PCR    2. Tobacco user  4 minutes was spent in educating patient of health risks " associated with tobacco, nicotine, and vaping. Verbalized understanding. She has not been successful in quitting in the past.  She is interested in quitting and has been looking into some resources on her own.  She has already looked into 1 800 quit now.    Instructed to return to Urgent Care or nearest Emergency Department if symptoms fail to improve, for any change in condition, further concerns, or new concerning symptoms. Patient states understanding of the plan of care and discharge instructions.    Annie Gallagher M.D.

## 2025-06-16 ENCOUNTER — OFFICE VISIT (OUTPATIENT)
Dept: URGENT CARE | Facility: PHYSICIAN GROUP | Age: 45
End: 2025-06-16
Payer: MEDICAID

## 2025-06-16 VITALS
OXYGEN SATURATION: 97 % | SYSTOLIC BLOOD PRESSURE: 122 MMHG | WEIGHT: 211 LBS | DIASTOLIC BLOOD PRESSURE: 96 MMHG | HEART RATE: 96 BPM | BODY MASS INDEX: 31.25 KG/M2 | HEIGHT: 69 IN | TEMPERATURE: 98.1 F | RESPIRATION RATE: 12 BRPM

## 2025-06-16 DIAGNOSIS — B37.0 THRUSH: ICD-10-CM

## 2025-06-16 DIAGNOSIS — R13.10 DYSPHAGIA, UNSPECIFIED TYPE: ICD-10-CM

## 2025-06-16 PROCEDURE — 3074F SYST BP LT 130 MM HG: CPT | Performed by: NURSE PRACTITIONER

## 2025-06-16 PROCEDURE — 99213 OFFICE O/P EST LOW 20 MIN: CPT | Performed by: NURSE PRACTITIONER

## 2025-06-16 PROCEDURE — 3080F DIAST BP >= 90 MM HG: CPT | Performed by: NURSE PRACTITIONER

## 2025-06-16 RX ORDER — DEXTROAMPHETAMINE SACCHARATE, AMPHETAMINE ASPARTATE, DEXTROAMPHETAMINE SULFATE AND AMPHETAMINE SULFATE 2.5; 2.5; 2.5; 2.5 MG/1; MG/1; MG/1; MG/1
TABLET ORAL
COMMUNITY

## 2025-06-16 RX ORDER — NYSTATIN 100000 [USP'U]/ML
500000 SUSPENSION ORAL 4 TIMES DAILY
Qty: 200 ML | Refills: 0 | Status: SHIPPED | OUTPATIENT
Start: 2025-06-16 | End: 2025-06-26

## 2025-06-16 RX ORDER — BUPROPION HYDROCHLORIDE 150 MG/1
TABLET ORAL
COMMUNITY
Start: 2025-05-05 | End: 2025-06-16

## 2025-06-16 RX ORDER — BUPROPION HYDROCHLORIDE 300 MG/1
TABLET ORAL
COMMUNITY
Start: 2025-05-29

## 2025-06-16 ASSESSMENT — FIBROSIS 4 INDEX: FIB4 SCORE: 0.68

## 2025-06-16 NOTE — PROGRESS NOTES
Chief Complaint   Patient presents with    Other     Pt states can't eat food        HISTORY OF PRESENT ILLNESS: Patient is a pleasant 45 y.o. female with a history of GERD and tobacco use who presents to urgent care today with complaints of difficulty swallowing.  Patient notes that for the past 2 weeks she has had difficulty swallowing, primarily on the left side of her throat.  She endorses pain which radiates up toward her left ear.  She denies any nasal congestion, seasonal allergies, sinus pressure, fever.  She does report recent oral sore and tongue pain.  She takes omeprazole daily for her GERD.    Patient Active Problem List    Diagnosis Date Noted    Overdose of cardiac medication, intentional self-harm, initial encounter ([facility]) 06/13/2024    Dehydration 06/13/2024    Bradycardia 06/13/2024    Suicide attempt ([facility]) 06/13/2024    Depression 06/13/2024    Tobacco abuse 10/16/2023    Mixed incontinence 05/22/2023    Rectocele 05/22/2023    Outlet dysfunction constipation 05/22/2023    Pelvic pain 05/22/2023    Dyspareunia, female 05/22/2023    Cigarette smoker 05/22/2023       Allergies:Morphine and Nkda [no known drug allergy]    Current Medications and Prescriptions Ordered in Epic[1]    Past Medical History[2]    Social History[3]    Family Status   Relation Name Status    Mo  Alive    Fa  Alive   No partnership data on file     Family History   Problem Relation Age of Onset    Heart Disease Mother        ROS:  Review of Systems   Constitutional: Negative for fever, chills, weight loss, malaise, and fatigue.   HENT: Positive for left ear pain, left-sided throat pain, difficulty swallowing.  Positive for tongue pain and oral lesion.  Negative for nosebleeds, congestion, and neck pain.    Eyes: Negative for vision changes.   Neuro: Negative for headache, sensory changes, weakness, seizure, LOC.   Cardiovascular: Negative for chest pain, palpitations, orthopnea and leg swelling.   Respiratory: Negative for  "cough, sputum production, shortness of breath and wheezing.   Gastrointestinal: Negative for abdominal pain, nausea, vomiting or diarrhea.   Genitourinary: Negative for dysuria, urgency and frequency.  Musculoskeletal: Negative for falls, neck pain, back pain, joint pain, myalgias.   Skin: Negative for rash, diaphoresis.     Exam:  BP (!) 122/96   Pulse 96   Temp 36.7 °C (98.1 °F) (Temporal)   Resp 12   Ht 1.753 m (5' 9\")   Wt 95.7 kg (211 lb)   SpO2 97%   General: well-nourished, well-developed female in NAD  Head: normocephalic, atraumatic  Eyes: PERRLA, no conjunctival injection, acuity grossly intact, lids normal.  Ears: normal shape and symmetry, no tenderness, no discharge. External canals are without any significant edema or erythema. Tympanic membranes are without any inflammation, no effusion. Gross auditory acuity is intact.  Nose: symmetrical without tenderness, no discharge.  No sinus tenderness.  Mouth/Throat: reasonable hygiene, no erythema, exudates or tonsillar enlargement.  White coating noted to posterior tongue.  Small, 2 mm ulcerated lesion noted to anterior lower lip.  Neck: no masses, range of motion within normal limits, no tracheal deviation. No obvious thyroid enlargement.   Lymph: no cervical adenopathy. No supraclavicular adenopathy.   Neuro: alert and oriented. Cranial nerves 1-12 grossly intact. No sensory deficit.   Cardiovascular: regular rate and rhythm. No edema.  Pulmonary: no distress. Chest is symmetrical with respiration, no wheezes, crackles, or rhonchi.   Musculoskeletal: no clubbing, appropriate muscle tone, gait is stable.  Skin: warm, dry, intact, no clubbing, no cyanosis, no rashes.   Psych: appropriate mood, affect, judgement.         Assessment/Plan:  1. Dysphagia, unspecified type  Referral to ENT      2. Thrush  nystatin (MYCOSTATIN) 528900 UNIT/ML Suspension          Patient is a pleasant 45-year-old who presents with concerns of recent difficulty swallowing, " primarily left-sided with associated pain.  There are no signs of bacterial process requiring antibiotic therapy.  Patient does have signs of thrush to posterior tongue, will treat for thrush. I have also discussed possibility of GERD contributing to her symptoms, she is encouraged to continue her omeprazole daily along with diet considerations.  She is instructed to follow-up with her PCP in 2 weeks for reevaluation.  In the meantime, referral to ENT is placed.  Supportive care, differential diagnoses, and indications for immediate follow-up discussed with patient.   Pathogenesis of diagnosis discussed including typical length and natural progression.   Instructed to return to clinic or nearest emergency department for any change in condition, further concerns, or worsening of symptoms.  Patient states understanding of the plan of care and discharge instructions.      Please note that this dictation was created using voice recognition software. I have made every reasonable attempt to correct obvious errors, but I expect that there are errors of grammar and possibly content that I did not discover before finalizing the note.      LAKISHA Carlin          [1]   Current Outpatient Medications Ordered in Epic   Medication Sig Dispense Refill    buPROPion (WELLBUTRIN XL) 300 MG XL tablet TAKE 1 TABLET BY MOUTH ONCE DAILY IN THE MORNING FOR MOOD      amphetamine-dextroamphetamine (ADDERALL) 10 MG Tab TAKE 1 TABLET BY MOUTH TWICE DAILY AS DIRECTED IN THE MORNING AND EARLY AFTERNOON WITH STIMULANT HOLIDAYS FOR ADHD. MAY FILL 2/11/25.      nystatin (MYCOSTATIN) 076290 UNIT/ML Suspension Take 5 mL by mouth 4 times a day for 10 days. 200 mL 0    sertraline (ZOLOFT) 100 MG Tab Take 100 mg by mouth every day.      busPIRone (BUSPAR) 10 MG Tab tablet Take 10 mg by mouth 2 times a day.      AJOVY 225 MG/1.5ML Solution Auto-injector INJECT 1 SYRINGE SUBCUTANEOUSLY ONCE EVERY MONTH      gabapentin (NEURONTIN) 400 MG Cap Take  400 mg by mouth 3 times a day.      BOTOX 200 units Recon Soln injection give 155 units IM per  FDA approved paradigm for treatment of chronic migraine disorder q.12 weeks.      NURTEC 75 MG TABLET DISPERSIBLE TAKE 1 TABLET BY MOUTH AT ONSET OF HEADACHE. OKAY TO REPEAT IN 24 HOURS IF NEEDED.       No current Epic-ordered facility-administered medications on file.   [2]   Past Medical History:  Diagnosis Date    Adverse effect of anesthesia     hypotension with colonscopy    Heart burn     Indigestion     Psychiatric disorder depression    Sleep apnea     no CPAP   [3]   Social History  Tobacco Use    Smoking status: Every Day     Current packs/day: 1.00     Average packs/day: 1 pack/day for 30.0 years (30.0 ttl pk-yrs)     Types: Cigarettes     Passive exposure: Current    Smokeless tobacco: Current    Tobacco comments:     vapes   Vaping Use    Vaping status: Every Day    Substances: Nicotine, Flavoring    Devices: Disposable    Passive vaping exposure: Yes   Substance Use Topics    Alcohol use: Not Currently     Comment: sober 1.5 yrs    Drug use: No

## 2025-06-17 NOTE — Clinical Note
REFERRAL APPROVAL NOTICE         Sent on June 17, 2025                   Edith Glass  [address] [place] [address]                   Dear Ms. Glass,    After a careful review of the medical information and benefit coverage, [facility] has processed your referral. See below for additional details.    If applicable, you must be actively enrolled with your insurance for coverage of the authorized service. If you have any questions regarding your coverage, please contact your insurance directly.    REFERRAL INFORMATION   Referral #:  [id number]  Referred-To Provider    Referred-By Provider:  Otolaryngology    LAKISHA Carlin MATTHEW D      [address]  [address]  [facility] [address] [phone] [address]  [address]  [facility] [address] [phone]    Referral Start Date:  06/16/2025  Referral End Date:   06/16/2026             SCHEDULING  If you do not already have an appointment, please call [phone] to make an appointment.     MORE INFORMATION  If you do not already have a [employer] account, sign up at: [email or web address]  You can access your medical information, make appointments, see lab results, billing information, and more.  If you have questions regarding this referral, please contact  the [facility] Referrals department at:             [phone]. Monday - Friday 8:00AM - 5:00PM.     Sincerely,    [facility]

## (undated) DEVICE — SUCTION INSTRUMENT YANKAUER BULBOUS TIP W/O VENT (50EA/CA)

## (undated) DEVICE — BLADE SURGICAL CLIPPER - (50EA/CA)

## (undated) DEVICE — CANISTER SUCTION 3000ML MECHANICAL FILTER AUTO SHUTOFF MEDI-VAC NONSTERILE LF DISP  (40EA/CA)

## (undated) DEVICE — TUBING CLEARLINK DUO-VENT - C-FLO (48EA/CA)

## (undated) DEVICE — TUBE EMG NIM TRIVANTAGE 7MM (3EA/PK)

## (undated) DEVICE — SET EXTENSION WITH 2 PORTS (48EA/CA) ***PART #2C8610 IS A SUBSTITUTE*****

## (undated) DEVICE — SENSOR OXIMETER ADULT SPO2 RD SET (20EA/BX)

## (undated) DEVICE — SUTURE 3-0 VICRYL PLUS SH - 8X 18 INCH (12/BX)

## (undated) DEVICE — ELECTRODE DUAL RETURN W/ CORD - (50/PK)

## (undated) DEVICE — TOOL MR8 14CM CYLINDER 5MM DIAMETER (1/EA)

## (undated) DEVICE — GLOVE BIOGEL PI INDICATOR SZ 7.5 SURGICAL PF LF -(50/BX 4BX/CA)

## (undated) DEVICE — DRAPE MICROSCOPE ARMATEC 120IN X 46IN (10EA/CA)

## (undated) DEVICE — GLOVE SZ 7.5 BIOGEL PI MICRO - PF LF (50PR/BX)

## (undated) DEVICE — TOWEL STOP TIMEOUT SAFETY FLAG (40EA/CA)

## (undated) DEVICE — LACTATED RINGERS INJ. 500 ML - (24EA/CA)

## (undated) DEVICE — DRAPE 36X28IN RAD CARM BND BG - (25/CA) O

## (undated) DEVICE — KIT SURGIFLO W/OUT THROMBIN - (6EA/CA)

## (undated) DEVICE — SODIUM CHL IRRIGATION 0.9% 1000ML (12EA/CA)

## (undated) DEVICE — SUTURE GENERAL

## (undated) DEVICE — GLOVE BIOGEL SZ 7 SURGICAL PF LTX - (50PR/BX 4BX/CA)

## (undated) DEVICE — GLOVE BIOGEL INDICATOR SZ 7.5 SURGICAL PF LTX - (50PR/BX 4BX/CA)

## (undated) DEVICE — SET LEADWIRE 5 LEAD BEDSIDE DISPOSABLE ECG (1SET OF 5/EA)

## (undated) DEVICE — GLOVE BIOGEL PI INDICATOR SZ 7.0 SURGICAL PF LF - (50/BX 4BX/CA)

## (undated) DEVICE — CHLORAPREP 26 ML APPLICATOR - ORANGE TINT(25/CA)

## (undated) DEVICE — DRESSING TRANSPARENT FILM TEGADERM 4 X 4.75" (50EA/BX)"

## (undated) DEVICE — DRILL

## (undated) DEVICE — GOWN WARMING STANDARD FLEX - (30/CA)

## (undated) DEVICE — GLOVE PROTEXIS W/NEU-THERA SZ 8.5 (50PR/BX)

## (undated) DEVICE — GOWN SURGICAL XX-LARGE - (28EA/CA) SIRUS NON REINFORCED

## (undated) DEVICE — NEEDLE SPINAL NON-SAFETY 18 GA X 3 IN (25EA/BX)

## (undated) DEVICE — CLOSURE SKIN STRIP 1/2 X 4 IN - (STERI STRIP) (50/BX 4BX/CA)

## (undated) DEVICE — SCREW DISTRACTION 14MM YELLOW - STERILE (10EA/BX) (5TX4=20)

## (undated) DEVICE — LIGHT SOURCE MIS 12FT

## (undated) DEVICE — GLOVE BIOGEL SZ 8 SURGICAL PF LTX - (50PR/BX 4BX/CA)

## (undated) DEVICE — BONE PRESS SPINAL EDITION HENSLER (10EA/CA)

## (undated) DEVICE — TUBING C&T SET FLYING LEADS DRAIN TUBING (10EA/BX)

## (undated) DEVICE — GLOVE BIOGEL INDICATOR SZ 8 SURGICAL PF LTX - (50/BX 4BX/CA)

## (undated) DEVICE — SHEET PEDIATRIC LAPAROTOMY - (10/CA)

## (undated) DEVICE — GOWN SURGEONS LARGE - (32/CA)

## (undated) DEVICE — GLOVE PROTEXIS PI MICRO SZ 8.5 (200PR/CA)

## (undated) DEVICE — SYRINGE NON SAFETY 10 CC 20 GA X 1-1/2 IN (100/BX 4BX/CA)

## (undated) DEVICE — SLEEVE, VASO, THIGH, MED

## (undated) DEVICE — RESTRAINTS LIMB DISP. - (12/BX 4BX/CA)

## (undated) DEVICE — MIDAS LUBRICATOR DIFFUSER PACK (4EA/CA)

## (undated) DEVICE — SUTURE 3-0 VICRYL PLUS RB-1 - 8 X 18 INCH (12/BX)

## (undated) DEVICE — GOWN SURGEONS X-LARGE - DISP. (30/CA)

## (undated) DEVICE — COVER MAYO STAND X-LG - (22EA/CA)

## (undated) DEVICE — COVER LIGHT HANDLE ALC PLUS DISP (18EA/BX)

## (undated) DEVICE — SPONGE GAUZESTER. 2X2 4-PL - (2/PK 50PK/BX 30BX/CS)

## (undated) DEVICE — CLIP MED INTNL HRZN TI ESCP - (25/BX)

## (undated) DEVICE — INTRAOP NEURO IN OR 1:1 PER 15 MIN

## (undated) DEVICE — CLIP SM INTNL HRZN TI ESCP LGT - (24EA/PK 25PK/BX)

## (undated) DEVICE — TOOL MR8 14CM MATCH HD SYM-TRI 3MM DIAMETER (1/EA)

## (undated) DEVICE — NEURO PACK

## (undated) DEVICE — KIT EVACUATER 3 SPRING PVC LF 1/8 DRAIN SIZE (10EA/CA)"

## (undated) DEVICE — DRAPE SURG STERI-DRAPE 7X11OD - (40EA/CA)

## (undated) DEVICE — SPONGE GAUZESTER 4 X 4 4PLY - (128PK/CA)

## (undated) DEVICE — LACTATED RINGERS INJ 1000 ML - (14EA/CA 60CA/PF)